# Patient Record
Sex: MALE | Race: WHITE | NOT HISPANIC OR LATINO | Employment: UNEMPLOYED | URBAN - METROPOLITAN AREA
[De-identification: names, ages, dates, MRNs, and addresses within clinical notes are randomized per-mention and may not be internally consistent; named-entity substitution may affect disease eponyms.]

---

## 2020-06-09 ENCOUNTER — TRANSCRIBE ORDERS (OUTPATIENT)
Dept: NEUROSURGERY | Facility: CLINIC | Age: 60
End: 2020-06-09

## 2020-06-09 DIAGNOSIS — M54.50 LOWER BACK PAIN: Primary | ICD-10-CM

## 2020-06-11 ENCOUNTER — CONSULT (OUTPATIENT)
Dept: NEUROSURGERY | Facility: CLINIC | Age: 60
End: 2020-06-11
Payer: COMMERCIAL

## 2020-06-11 VITALS
HEART RATE: 76 BPM | TEMPERATURE: 97.2 F | DIASTOLIC BLOOD PRESSURE: 104 MMHG | SYSTOLIC BLOOD PRESSURE: 139 MMHG | WEIGHT: 265 LBS | HEIGHT: 75 IN | RESPIRATION RATE: 16 BRPM | BODY MASS INDEX: 32.95 KG/M2

## 2020-06-11 DIAGNOSIS — Z79.4 TYPE 2 DIABETES MELLITUS WITHOUT COMPLICATION, WITH LONG-TERM CURRENT USE OF INSULIN (HCC): ICD-10-CM

## 2020-06-11 DIAGNOSIS — G57.72 COMPLEX REGIONAL PAIN SYNDROME TYPE 2 OF LEFT LOWER EXTREMITY: ICD-10-CM

## 2020-06-11 DIAGNOSIS — I63.9 CEREBROVASCULAR ACCIDENT (CVA), UNSPECIFIED MECHANISM (HCC): ICD-10-CM

## 2020-06-11 DIAGNOSIS — M54.50 LOWER BACK PAIN: ICD-10-CM

## 2020-06-11 DIAGNOSIS — E11.9 TYPE 2 DIABETES MELLITUS WITHOUT COMPLICATION, WITH LONG-TERM CURRENT USE OF INSULIN (HCC): ICD-10-CM

## 2020-06-11 DIAGNOSIS — I49.5 SICK SINUS SYNDROME (HCC): ICD-10-CM

## 2020-06-11 DIAGNOSIS — M96.1 FAILED BACK SYNDROME: Primary | ICD-10-CM

## 2020-06-11 DIAGNOSIS — T84.621A: ICD-10-CM

## 2020-06-11 DIAGNOSIS — G89.4 CHRONIC PAIN SYNDROME: ICD-10-CM

## 2020-06-11 PROCEDURE — 99205 OFFICE O/P NEW HI 60 MIN: CPT | Performed by: NEUROLOGICAL SURGERY

## 2020-06-11 RX ORDER — MORPHINE SULFATE 15 MG/1
15 TABLET, FILM COATED, EXTENDED RELEASE ORAL
COMMUNITY
End: 2021-03-07

## 2020-06-11 RX ORDER — GABAPENTIN 300 MG/1
400 CAPSULE ORAL 3 TIMES DAILY
COMMUNITY
Start: 2020-03-24

## 2020-06-11 RX ORDER — AMOXICILLIN 500 MG/1
500 TABLET, FILM COATED ORAL 3 TIMES DAILY
COMMUNITY
Start: 2020-04-02 | End: 2020-06-22 | Stop reason: SDUPTHER

## 2020-06-11 RX ORDER — ATORVASTATIN CALCIUM 40 MG/1
40 TABLET, FILM COATED ORAL
COMMUNITY
Start: 2020-05-04

## 2020-06-11 RX ORDER — NICOTINE POLACRILEX 2 MG
LOZENGE BUCCAL
COMMUNITY

## 2020-06-11 RX ORDER — APIXABAN 5 MG/1
5 TABLET, FILM COATED ORAL 2 TIMES DAILY
COMMUNITY
Start: 2020-05-05

## 2020-06-11 RX ORDER — INSULIN ASPART 100 [IU]/ML
INJECTION, SUSPENSION SUBCUTANEOUS
COMMUNITY
Start: 2020-06-10

## 2020-06-11 RX ORDER — ASPIRIN 81 MG/1
81 TABLET, CHEWABLE ORAL DAILY
COMMUNITY
Start: 2020-04-14

## 2020-06-11 RX ORDER — TAMSULOSIN HYDROCHLORIDE 0.4 MG/1
0.4 CAPSULE ORAL
COMMUNITY
Start: 2019-10-08

## 2020-06-11 RX ORDER — ACETAMINOPHEN 500 MG
500 TABLET ORAL EVERY 6 HOURS PRN
COMMUNITY

## 2020-06-19 ENCOUNTER — TELEPHONE (OUTPATIENT)
Dept: INFECTIOUS DISEASES | Facility: CLINIC | Age: 60
End: 2020-06-19

## 2020-06-22 ENCOUNTER — OFFICE VISIT (OUTPATIENT)
Dept: INFECTIOUS DISEASES | Facility: CLINIC | Age: 60
End: 2020-06-22
Payer: COMMERCIAL

## 2020-06-22 VITALS
HEIGHT: 75 IN | WEIGHT: 265 LBS | DIASTOLIC BLOOD PRESSURE: 64 MMHG | HEART RATE: 60 BPM | TEMPERATURE: 97.8 F | SYSTOLIC BLOOD PRESSURE: 132 MMHG | BODY MASS INDEX: 32.95 KG/M2

## 2020-06-22 DIAGNOSIS — G89.29 CHRONIC PAIN OF LEFT LOWER EXTREMITY: ICD-10-CM

## 2020-06-22 DIAGNOSIS — T84.621D: Primary | ICD-10-CM

## 2020-06-22 DIAGNOSIS — Z85.72 HISTORY OF NON-HODGKIN'S LYMPHOMA: ICD-10-CM

## 2020-06-22 DIAGNOSIS — E11.69 TYPE 2 DIABETES MELLITUS WITH OTHER SPECIFIED COMPLICATION, UNSPECIFIED WHETHER LONG TERM INSULIN USE (HCC): ICD-10-CM

## 2020-06-22 DIAGNOSIS — G89.29 CHRONIC LOW BACK PAIN WITHOUT SCIATICA, UNSPECIFIED BACK PAIN LATERALITY: ICD-10-CM

## 2020-06-22 DIAGNOSIS — Z86.19 HISTORY OF STAPH INFECTION: ICD-10-CM

## 2020-06-22 DIAGNOSIS — M79.605 CHRONIC PAIN OF LEFT LOWER EXTREMITY: ICD-10-CM

## 2020-06-22 DIAGNOSIS — M54.50 CHRONIC LOW BACK PAIN WITHOUT SCIATICA, UNSPECIFIED BACK PAIN LATERALITY: ICD-10-CM

## 2020-06-22 PROCEDURE — 99244 OFF/OP CNSLTJ NEW/EST MOD 40: CPT | Performed by: INTERNAL MEDICINE

## 2020-06-22 RX ORDER — AMOXICILLIN 500 MG/1
500 TABLET, FILM COATED ORAL EVERY 8 HOURS
Qty: 270 TABLET | Refills: 0 | Status: SHIPPED | OUTPATIENT
Start: 2020-06-22 | End: 2020-09-20

## 2020-06-29 ENCOUNTER — TELEPHONE (OUTPATIENT)
Dept: NEUROSURGERY | Facility: CLINIC | Age: 60
End: 2020-06-29

## 2020-07-02 ENCOUNTER — TRANSCRIBE ORDERS (OUTPATIENT)
Dept: ADMINISTRATIVE | Facility: HOSPITAL | Age: 60
End: 2020-07-02

## 2020-07-02 DIAGNOSIS — M86.552: Primary | ICD-10-CM

## 2020-07-14 ENCOUNTER — HOSPITAL ENCOUNTER (OUTPATIENT)
Dept: NUCLEAR MEDICINE | Facility: HOSPITAL | Age: 60
Discharge: HOME | End: 2020-07-14
Payer: COMMERCIAL

## 2020-07-14 DIAGNOSIS — M86.552: ICD-10-CM

## 2020-07-14 PROCEDURE — 78803 RP LOCLZJ TUM SPECT 1 AREA: CPT

## 2020-07-14 PROCEDURE — A9570 INDIUM IN-111 AUTO WBC: HCPCS

## 2020-07-15 ENCOUNTER — HOSPITAL ENCOUNTER (OUTPATIENT)
Dept: NUCLEAR MEDICINE | Facility: HOSPITAL | Age: 60
Discharge: HOME | End: 2020-07-15
Payer: COMMERCIAL

## 2020-07-15 PROCEDURE — A9541 TC99M SULFUR COLLOID: HCPCS

## 2020-07-15 PROCEDURE — 78102 BONE MARROW IMAGING LTD: CPT

## 2020-07-17 ENCOUNTER — TELEPHONE (OUTPATIENT)
Dept: NEUROSURGERY | Facility: CLINIC | Age: 60
End: 2020-07-17

## 2020-07-17 NOTE — TELEPHONE ENCOUNTER
7/17/20 PT CALLED BACK AND STATES HIS NUCLEAR BONE SCAN IS NEGATIVE SO HE IS GOING TO MOVE FORWARD WITH STIM TRIAL WITH HIS PM DRDR Pathak Backer  HE HAS APPT WITH HER NEXT WED 7/22/20  HE WILL HAVE HER OFFICE FAX RECORDS OF TRIAL IF SUCCESSFUL SO HE CAN RETURN WITH DR POTTER TO DISCUSS PERM STIM

## 2020-07-24 ENCOUNTER — TRANSCRIBE ORDERS (OUTPATIENT)
Dept: ADMINISTRATIVE | Facility: HOSPITAL | Age: 60
End: 2020-07-24

## 2020-07-24 DIAGNOSIS — Z01.818 PRE-OPERATIVE CLEARANCE: Primary | ICD-10-CM

## 2020-09-02 ENCOUNTER — HOSPITAL ENCOUNTER (OUTPATIENT)
Dept: MRI IMAGING | Facility: HOSPITAL | Age: 60
Discharge: HOME | End: 2020-09-02
Payer: COMMERCIAL

## 2020-09-02 ENCOUNTER — HOSPITAL ENCOUNTER (OUTPATIENT)
Dept: RADIOLOGY | Facility: HOSPITAL | Age: 60
Discharge: HOME | End: 2020-09-02
Payer: COMMERCIAL

## 2020-09-02 DIAGNOSIS — Z01.818 PRE-OPERATIVE CLEARANCE: ICD-10-CM

## 2020-09-02 PROCEDURE — 72146 MRI CHEST SPINE W/O DYE: CPT

## 2020-09-02 NOTE — PROGRESS NOTES
Patient's pacer interrogated by Desk and placed in MRI safe mode  Patient's HR and O2 status monitored for entire MRI  No complications  Patient's pacer placed back in original mode after MRI complete

## 2020-09-17 ENCOUNTER — TELEPHONE (OUTPATIENT)
Dept: INFECTIOUS DISEASES | Facility: CLINIC | Age: 60
End: 2020-09-17

## 2020-09-21 ENCOUNTER — APPOINTMENT (OUTPATIENT)
Dept: LAB | Facility: HOSPITAL | Age: 60
End: 2020-09-21
Attending: INTERNAL MEDICINE
Payer: COMMERCIAL

## 2020-09-21 LAB
BASOPHILS # BLD AUTO: 0.05 THOUSANDS/ΜL (ref 0–0.1)
BASOPHILS NFR BLD AUTO: 1 % (ref 0–1)
CREAT SERPL-MCNC: 1.03 MG/DL (ref 0.6–1.3)
EOSINOPHIL # BLD AUTO: 0.13 THOUSAND/ΜL (ref 0–0.61)
EOSINOPHIL NFR BLD AUTO: 2 % (ref 0–6)
ERYTHROCYTE [DISTWIDTH] IN BLOOD BY AUTOMATED COUNT: 12.9 % (ref 11.6–15.1)
GFR SERPL CREATININE-BSD FRML MDRD: 79 ML/MIN/1.73SQ M
HCT VFR BLD AUTO: 46.5 % (ref 36.5–49.3)
HGB BLD-MCNC: 15 G/DL (ref 12–17)
IMM GRANULOCYTES # BLD AUTO: 0.03 THOUSAND/UL (ref 0–0.2)
IMM GRANULOCYTES NFR BLD AUTO: 0 % (ref 0–2)
LYMPHOCYTES # BLD AUTO: 1.61 THOUSANDS/ΜL (ref 0.6–4.47)
LYMPHOCYTES NFR BLD AUTO: 21 % (ref 14–44)
MCH RBC QN AUTO: 29 PG (ref 26.8–34.3)
MCHC RBC AUTO-ENTMCNC: 32.3 G/DL (ref 31.4–37.4)
MCV RBC AUTO: 90 FL (ref 82–98)
MONOCYTES # BLD AUTO: 0.66 THOUSAND/ΜL (ref 0.17–1.22)
MONOCYTES NFR BLD AUTO: 8 % (ref 4–12)
NEUTROPHILS # BLD AUTO: 5.38 THOUSANDS/ΜL (ref 1.85–7.62)
NEUTS SEG NFR BLD AUTO: 68 % (ref 43–75)
NRBC BLD AUTO-RTO: 0 /100 WBCS
PLATELET # BLD AUTO: 220 THOUSANDS/UL (ref 149–390)
PMV BLD AUTO: 11.3 FL (ref 8.9–12.7)
RBC # BLD AUTO: 5.18 MILLION/UL (ref 3.88–5.62)
WBC # BLD AUTO: 7.86 THOUSAND/UL (ref 4.31–10.16)

## 2020-09-21 PROCEDURE — 85025 COMPLETE CBC W/AUTO DIFF WBC: CPT

## 2020-09-21 PROCEDURE — 82565 ASSAY OF CREATININE: CPT

## 2020-09-21 PROCEDURE — 36415 COLL VENOUS BLD VENIPUNCTURE: CPT

## 2020-09-23 ENCOUNTER — OFFICE VISIT (OUTPATIENT)
Dept: INFECTIOUS DISEASES | Facility: CLINIC | Age: 60
End: 2020-09-23
Payer: COMMERCIAL

## 2020-09-23 VITALS
HEIGHT: 75 IN | RESPIRATION RATE: 16 BRPM | WEIGHT: 290.4 LBS | HEART RATE: 84 BPM | BODY MASS INDEX: 36.11 KG/M2 | TEMPERATURE: 98.4 F | DIASTOLIC BLOOD PRESSURE: 72 MMHG | SYSTOLIC BLOOD PRESSURE: 128 MMHG

## 2020-09-23 DIAGNOSIS — T84.621D: Primary | ICD-10-CM

## 2020-09-23 DIAGNOSIS — G89.29 CHRONIC LEFT-SIDED LOW BACK PAIN WITH LEFT-SIDED SCIATICA: ICD-10-CM

## 2020-09-23 DIAGNOSIS — M54.42 CHRONIC LEFT-SIDED LOW BACK PAIN WITH LEFT-SIDED SCIATICA: ICD-10-CM

## 2020-09-23 PROBLEM — M54.40 CHRONIC LEFT-SIDED LOW BACK PAIN WITH SCIATICA: Status: ACTIVE | Noted: 2020-09-23

## 2020-09-23 PROCEDURE — 99214 OFFICE O/P EST MOD 30 MIN: CPT | Performed by: INTERNAL MEDICINE

## 2020-09-23 RX ORDER — AMOXICILLIN 500 MG/1
500 CAPSULE ORAL EVERY 8 HOURS
Qty: 270 CAPSULE | Refills: 1 | Status: SHIPPED | OUTPATIENT
Start: 2020-09-23 | End: 2021-03-07

## 2020-09-23 RX ORDER — SOTALOL HYDROCHLORIDE 80 MG/1
80 TABLET ORAL EVERY 12 HOURS
COMMUNITY
Start: 2020-09-19

## 2020-09-23 RX ORDER — AMOXICILLIN 500 MG/1
500 CAPSULE ORAL EVERY 8 HOURS
COMMUNITY
Start: 2020-07-20 | End: 2020-09-23 | Stop reason: SDUPTHER

## 2020-09-23 NOTE — PROGRESS NOTES
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  Patient remains on suppressive antibiotic therapy after relapse of infection in his left femur and concern for chronic hardware infection  WBC scan from 7/15/20 negative for infection/osteomyelitis  Seems to be tolerating oral amoxicillin without difficulty  Discussed risks vs benefits of continuing longterm antibiotic  As patient will be undergoing spinal cord stimulator placement, plan for continue for foreseeable future  Continue amoxicillin 500 Q 8 suppression for now, likely indefinitely  Continue monthly lab with CBCD and Cr  Followup in 3-4 months      2  Chronic back and leg pain  Patient has developed progressing pain and immobility in the leg as of March with some degree of back pain  He was seen by neurosurgery who ultimately recommended spinal cord stimulator  Will continue suppressive antibiotic for now  Plan for spinal cord stimulator placement next week  Neurosurgery/pain management followup    3  Previous hardware infections with MSSA and Serratia  Remains on amoxicillin as recent infection involved Enterococcus as above      4  History of non-Hodgkin's lymphoma  Course was complicated by femur fracture and later developed infections as above  Continued follow-up with oncologist/PCP      5  Type 2 diabetes  Ongoing follow-up by PCP  Followup in 3-4 months  Subjective:  Patient is here for followup  Complicated medical history including chronic hardware infection of left femur previousl managed at [facility]  He has previously undergone left distal femur replacement and ultimately placed on oral amoxicillin indefinitely for suppression due to hardware retention  Patient recently transitioned care to us in June 2020  He has developed progressive back pain radiating down left leg  He was seen by neurosurgery with recommendation for spinal cord stimulator placement  Patient was referred for WBC scan, which was performed on 7/15/20 showing no uptake in left leg to suggest active infection or osteomyelitis  Patient is now scheduled to undergo spinal cord stimulator placement next week  He feels well  Occasional loose stools, but not daily  No nausea  No fevers or chills  No opens wounds  Personally reviewed and updated as appropriate: past medical hx, past surgical hx, social hx, current meds, allergies, problem list      Objective:  Vitals:  [unfilled]  [email or web address]:      Physical Exam:   General:  No acute distress  HEENT: AT/NC  Psychiatric:  Awake and alert  Pulmonary:  Normal respiratory excursion without accessory muscle use  Abdomen:  Soft, nontender  Extremities:  No edema  Skin:  No rashes  Neuro: No obvious focal deficits  Lab Results:  I have personally reviewed pertinent labs  Results from last 7 days   Lab Units 09/21/20  1301   CREATININE mg/dL 1 03   EGFR ml/min/1 73sq m 79     Results from last 7 days   Lab Units 09/21/20  1301   WBC Thousand/uL 7 86   HEMOGLOBIN g/dL 15 0   PLATELETS Thousands/uL 220           Imaging Studies:   I have personally reviewed pertinent imaging study reports and images in PACS  7/15 WBC scan showed no update at left femur  EKG, Pathology, and Other Studies:   I have personally reviewed pertinent reports

## 2020-09-23 NOTE — PATIENT INSTRUCTIONS
Continue amoxicillin as discussed  Please obtain monthly labwork  Follow up in office in Jan/Feb  We will call you to schedule

## 2020-10-01 ENCOUNTER — TELEPHONE (OUTPATIENT)
Dept: NEUROSURGERY | Facility: CLINIC | Age: 60
End: 2020-10-01

## 2021-01-04 ENCOUNTER — TELEPHONE (OUTPATIENT)
Dept: INFECTIOUS DISEASES | Facility: CLINIC | Age: 61
End: 2021-01-04

## 2021-01-04 NOTE — TELEPHONE ENCOUNTER
Patient and spouse call today to report that they had gone to ortho today which is in [address] [place] [address]  They are asked to hold their antibiotics for three weeks until 1/25 (next ortho visit) so that they can take a bone biopsy to determine if there is residual infection  Patient has f/u scheduled with Brenda HATCH 1/26 which I did confirm with them that they would be sure to make  This will be to discuss/determine plan based on f/u ortho visit 1/25  Routed this message to Dr Coleen Olson

## 2021-02-08 ENCOUNTER — TELEPHONE (OUTPATIENT)
Dept: INFECTIOUS DISEASES | Facility: CLINIC | Age: 61
End: 2021-02-08

## 2021-03-07 ENCOUNTER — HOSPITAL ENCOUNTER (EMERGENCY)
Facility: HOSPITAL | Age: 61
Discharge: ANOTHER HOSPITAL | End: 2021-03-07
Attending: EMERGENCY MEDICINE | Admitting: EMERGENCY MEDICINE
Payer: COMMERCIAL

## 2021-03-07 VITALS
WEIGHT: 309.75 LBS | DIASTOLIC BLOOD PRESSURE: 62 MMHG | SYSTOLIC BLOOD PRESSURE: 135 MMHG | BODY MASS INDEX: 38.72 KG/M2 | OXYGEN SATURATION: 98 % | TEMPERATURE: 98.2 F | HEART RATE: 69 BPM | RESPIRATION RATE: 20 BRPM

## 2021-03-07 DIAGNOSIS — R09.89 DECREASED PULSE: ICD-10-CM

## 2021-03-07 DIAGNOSIS — G89.18 POSTOPERATIVE PAIN: Primary | ICD-10-CM

## 2021-03-07 DIAGNOSIS — I99.8 LIMB ISCHEMIA: ICD-10-CM

## 2021-03-07 LAB
ANION GAP SERPL CALCULATED.3IONS-SCNC: 4 MMOL/L (ref 4–13)
APTT PPP: 34 SECONDS (ref 23–37)
BASOPHILS # BLD AUTO: 0.04 THOUSANDS/ΜL (ref 0–0.1)
BASOPHILS NFR BLD AUTO: 1 % (ref 0–1)
BUN SERPL-MCNC: 13 MG/DL (ref 5–25)
CALCIUM SERPL-MCNC: 9 MG/DL (ref 8.3–10.1)
CHLORIDE SERPL-SCNC: 101 MMOL/L (ref 100–108)
CO2 SERPL-SCNC: 31 MMOL/L (ref 21–32)
CREAT SERPL-MCNC: 1.27 MG/DL (ref 0.6–1.3)
EOSINOPHIL # BLD AUTO: 0.19 THOUSAND/ΜL (ref 0–0.61)
EOSINOPHIL NFR BLD AUTO: 2 % (ref 0–6)
ERYTHROCYTE [DISTWIDTH] IN BLOOD BY AUTOMATED COUNT: 14 % (ref 11.6–15.1)
GFR SERPL CREATININE-BSD FRML MDRD: 61 ML/MIN/1.73SQ M
GLUCOSE SERPL-MCNC: 171 MG/DL (ref 65–140)
HCT VFR BLD AUTO: 31.2 % (ref 36.5–49.3)
HGB BLD-MCNC: 9.8 G/DL (ref 12–17)
INR PPP: 1.36 (ref 0.84–1.19)
LYMPHOCYTES # BLD AUTO: 2.22 THOUSANDS/ΜL (ref 0.6–4.47)
LYMPHOCYTES NFR BLD AUTO: 26 % (ref 14–44)
MCH RBC QN AUTO: 28.7 PG (ref 26.8–34.3)
MCHC RBC AUTO-ENTMCNC: 31.4 G/DL (ref 31.4–37.4)
MCV RBC AUTO: 91 FL (ref 82–98)
MONOCYTES # BLD AUTO: 0.93 THOUSAND/ΜL (ref 0.17–1.22)
MONOCYTES NFR BLD AUTO: 11 % (ref 4–12)
NEUTROPHILS # BLD AUTO: 5.29 THOUSANDS/ΜL (ref 1.85–7.62)
NEUTS SEG NFR BLD AUTO: 60 % (ref 43–75)
PLATELET # BLD AUTO: 291 THOUSANDS/UL (ref 149–390)
PMV BLD AUTO: 10.4 FL (ref 8.9–12.7)
POTASSIUM SERPL-SCNC: 4.2 MMOL/L (ref 3.5–5.3)
PROTHROMBIN TIME: 17 SECONDS (ref 11.6–14.5)
RBC # BLD AUTO: 3.42 MILLION/UL (ref 3.88–5.62)
SODIUM SERPL-SCNC: 136 MMOL/L (ref 136–145)
WBC # BLD AUTO: 8.67 THOUSAND/UL (ref 4.31–10.16)

## 2021-03-07 PROCEDURE — 85610 PROTHROMBIN TIME: CPT | Performed by: EMERGENCY MEDICINE

## 2021-03-07 PROCEDURE — 99285 EMERGENCY DEPT VISIT HI MDM: CPT

## 2021-03-07 PROCEDURE — 96374 THER/PROPH/DIAG INJ IV PUSH: CPT

## 2021-03-07 PROCEDURE — 85730 THROMBOPLASTIN TIME PARTIAL: CPT | Performed by: EMERGENCY MEDICINE

## 2021-03-07 PROCEDURE — 85025 COMPLETE CBC W/AUTO DIFF WBC: CPT | Performed by: EMERGENCY MEDICINE

## 2021-03-07 PROCEDURE — 80048 BASIC METABOLIC PNL TOTAL CA: CPT | Performed by: EMERGENCY MEDICINE

## 2021-03-07 PROCEDURE — 36415 COLL VENOUS BLD VENIPUNCTURE: CPT | Performed by: EMERGENCY MEDICINE

## 2021-03-07 PROCEDURE — 99285 EMERGENCY DEPT VISIT HI MDM: CPT | Performed by: EMERGENCY MEDICINE

## 2021-03-07 RX ORDER — FENTANYL CITRATE 50 UG/ML
50 INJECTION, SOLUTION INTRAMUSCULAR; INTRAVENOUS ONCE
Status: COMPLETED | OUTPATIENT
Start: 2021-03-07 | End: 2021-03-07

## 2021-03-07 RX ORDER — OXYCODONE HYDROCHLORIDE 5 MG/1
5 TABLET ORAL
COMMUNITY
Start: 2021-02-21

## 2021-03-07 RX ADMIN — FENTANYL CITRATE 50 MCG: 50 INJECTION, SOLUTION INTRAMUSCULAR; INTRAVENOUS at 22:26

## 2021-03-08 NOTE — ED NOTES
information: [facility]   20 min   Lamarr Lundborg Dr Shella Caprice  [phone]     Azul Cloud, RN  03/07/21 [id number]

## 2021-03-08 NOTE — EMTALA/ACUTE CARE TRANSFER
[facility] [address]  [address]  [place] [address]  Dept: [phone]      EMTALA TRANSFER CONSENT    NAME Debbie Garcia                                         1960                              MRN [id number]    I have been informed of my rights regarding examination, treatment, and transfer   by Dr Indra Neville MD    Benefits: Specialized equipment and/or services available at the receiving facility (Include comment)________________________, Continuity of care(Vascular (unavailable at this time), Orthopaedics (continuity))    Risks: Potential for delay in receiving treatment, Potential deterioration of medical condition, Loss of IV, Possible worsening of condition or death during transfer, Increased discomfort during transfer      Transfer Request   I acknowledge that my medical condition has been evaluated and explained to me by the emergency department physician or other qualified medical person and/or my attending physician who has recommended and offered to me further medical examination and treatment  I understand the Hospital's obligation with respect to the treatment and stabilization of my emergency medical condition  I nevertheless request to be transferred  I release the Hospital, the doctor, and any other persons caring for me from all responsibility or liability for any injury or ill effects that may result from my transfer and agree to accept all responsibility for the consequences of my choice to transfer, rather than receive stabilizing treatment at the Hospital  I understand that because the transfer is my request, my insurance may not provide reimbursement for the services  The Hospital will assist and direct me and my family in how to make arrangements for transfer, but the hospital is not liable for any fees charged by the transport service    In spite of this understanding, I refuse to consent to further medical examination and treatment which has been offered to me, and request transfer to [address] : [facility]  I authorize the performance of emergency medical procedures and treatments upon me in both transit and upon arrival at the receiving facility  Additionally, I authorize the release of any and all medical records to the receiving facility and request they be transported with me, if possible  I authorize the performance of emergency medical procedures and treatments upon me in both transit and upon arrival at the receiving facility  Additionally, I authorize the release of any and all medical records to the receiving facility and request they be transported with me, if possible  I understand that the safest mode of transportation during a medical emergency is an ambulance and that the Hospital advocates the use of this mode of transport  Risks of traveling to the receiving facility by car, including absence of medical control, life sustaining equipment, such as oxygen, and medical personnel has been explained to me and I fully understand them  (KASEY CORRECT BOX BELOW)  [  ]  I consent to the stated transfer and to be transported by ambulance/helicopter  [  ]  I consent to the stated transfer, but refuse transportation by ambulance and accept full responsibility for my transportation by car  I understand the risks of non-ambulance transfers and I exonerate the Hospital and its staff from any deterioration in my condition that results from this refusal     X___________________________________________    DATE  21  TIME________  Signature of patient or legally responsible individual signing on patient behalf           RELATIONSHIP TO PATIENT_________________________          Provider Certification    NAME Kayla Keys                                         1960                              MRN [id number]    A medical screening exam was performed on the above named patient    Based on the examination:    Condition Necessitating Transfer The primary encounter diagnosis was Postoperative pain  A diagnosis of Decreased pulse was also pertinent to this visit  Patient Condition: The patient has been stabilized such that within reasonable medical probability, no material deterioration of the patient condition or the condition of the unborn child(nina) is likely to result from the transfer    Reason for Transfer: Level of Care needed not available at this facility    Transfer Requirements: [facility] [address]   · Space available and qualified personnel available for treatment as acknowledged by [facility]   · Agreed to accept transfer and to provide appropriate medical treatment as acknowledged by       Carrie Frederick  · Appropriate medical records of the examination and treatment of the patient are provided at the time of transfer   [address] _______  · Transfer will be performed by qualified personnel from [facility]  and appropriate transfer equipment as required, including the use of necessary and appropriate life support measures      Provider Certification: I have examined the patient and explained the following risks and benefits of being transferred/refusing transfer to the patient/family:  General risk, such as traffic hazards, adverse weather conditions, rough terrain or turbulence, possible failure of equipment (including vehicle or aircraft), or consequences of actions of persons outside the control of the transport personnel, The possibility of a transport vehicle being unavailable, Unanticipated needs of medical equipment and personnel during transport, Risk of worsening condition      Based on these reasonable risks and benefits to the patient and/or the unborn child(nina), and based upon the information available at the time of the patients examination, I certify that the medical benefits reasonably to be expected from the provision of appropriate medical treatments at another medical facility outweigh the increasing risks, if any, to the individuals medical condition, and in the case of labor to the unborn child, from effecting the transfer      X____________________________________________ DATE 03/07/21        TIME_______      ORIGINAL - SEND TO MEDICAL RECORDS   COPY - SEND WITH PATIENT DURING TRANSFER
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
## 2021-03-08 NOTE — ED NOTES
Report called to accepting RN, Konrad Burden at [facility] ER       Yajaira Guillen RN  03/07/21 9780

## 2021-04-27 ENCOUNTER — APPOINTMENT (EMERGENCY)
Dept: CT IMAGING | Facility: HOSPITAL | Age: 61
DRG: 463 | End: 2021-04-27
Payer: COMMERCIAL

## 2021-04-27 ENCOUNTER — APPOINTMENT (EMERGENCY)
Dept: RADIOLOGY | Facility: HOSPITAL | Age: 61
DRG: 463 | End: 2021-04-27
Payer: COMMERCIAL

## 2021-04-27 ENCOUNTER — HOSPITAL ENCOUNTER (INPATIENT)
Facility: HOSPITAL | Age: 61
LOS: 3 days | Discharge: HOME | DRG: 463 | End: 2021-04-30
Attending: EMERGENCY MEDICINE | Admitting: INTERNAL MEDICINE
Payer: COMMERCIAL

## 2021-04-27 DIAGNOSIS — N30.00 ACUTE CYSTITIS WITHOUT HEMATURIA: ICD-10-CM

## 2021-04-27 DIAGNOSIS — N30.90 CYSTITIS: ICD-10-CM

## 2021-04-27 DIAGNOSIS — N39.0 URINARY TRACT INFECTION: ICD-10-CM

## 2021-04-27 DIAGNOSIS — A41.9 SEPSIS (HCC): Primary | ICD-10-CM

## 2021-04-27 DIAGNOSIS — G47.00 INSOMNIA: ICD-10-CM

## 2021-04-27 DIAGNOSIS — M54.40 CHRONIC LEFT-SIDED LOW BACK PAIN WITH SCIATICA: ICD-10-CM

## 2021-04-27 DIAGNOSIS — G89.29 CHRONIC LEFT-SIDED LOW BACK PAIN WITH SCIATICA: ICD-10-CM

## 2021-04-27 DIAGNOSIS — R33.9 URINARY RETENTION: ICD-10-CM

## 2021-04-27 LAB
ALBUMIN SERPL BCP-MCNC: 3.1 G/DL (ref 3.5–5)
ALP SERPL-CCNC: 80 U/L (ref 46–116)
ALT SERPL W P-5'-P-CCNC: 36 U/L (ref 12–78)
ANION GAP SERPL CALCULATED.3IONS-SCNC: 8 MMOL/L (ref 4–13)
AST SERPL W P-5'-P-CCNC: 31 U/L (ref 5–45)
BACTERIA UR QL AUTO: ABNORMAL /HPF
BASOPHILS # BLD AUTO: 0.05 THOUSANDS/ΜL (ref 0–0.1)
BASOPHILS NFR BLD AUTO: 0 % (ref 0–1)
BILIRUB DIRECT SERPL-MCNC: 0.2 MG/DL (ref 0–0.2)
BILIRUB SERPL-MCNC: 0.96 MG/DL (ref 0.2–1)
BILIRUB UR QL STRIP: NEGATIVE
BUN SERPL-MCNC: 19 MG/DL (ref 5–25)
CALCIUM SERPL-MCNC: 8.9 MG/DL (ref 8.3–10.1)
CHLORIDE SERPL-SCNC: 96 MMOL/L (ref 100–108)
CLARITY UR: CLEAR
CO2 SERPL-SCNC: 27 MMOL/L (ref 21–32)
COLOR UR: ABNORMAL
CREAT SERPL-MCNC: 1.27 MG/DL (ref 0.6–1.3)
EOSINOPHIL # BLD AUTO: 0.03 THOUSAND/ΜL (ref 0–0.61)
EOSINOPHIL NFR BLD AUTO: 0 % (ref 0–6)
ERYTHROCYTE [DISTWIDTH] IN BLOOD BY AUTOMATED COUNT: 14.6 % (ref 11.6–15.1)
GFR SERPL CREATININE-BSD FRML MDRD: 61 ML/MIN/1.73SQ M
GLUCOSE SERPL-MCNC: 247 MG/DL (ref 65–140)
GLUCOSE UR STRIP-MCNC: ABNORMAL MG/DL
HCT VFR BLD AUTO: 43 % (ref 36.5–49.3)
HGB BLD-MCNC: 13.8 G/DL (ref 12–17)
HGB UR QL STRIP.AUTO: ABNORMAL
IMM GRANULOCYTES # BLD AUTO: 0.12 THOUSAND/UL (ref 0–0.2)
IMM GRANULOCYTES NFR BLD AUTO: 1 % (ref 0–2)
KETONES UR STRIP-MCNC: ABNORMAL MG/DL
LACTATE SERPL-SCNC: 1.4 MMOL/L (ref 0.5–2)
LEUKOCYTE ESTERASE UR QL STRIP: ABNORMAL
LIPASE SERPL-CCNC: 29 U/L (ref 73–393)
LYMPHOCYTES # BLD AUTO: 1.22 THOUSANDS/ΜL (ref 0.6–4.47)
LYMPHOCYTES NFR BLD AUTO: 8 % (ref 14–44)
MCH RBC QN AUTO: 27.2 PG (ref 26.8–34.3)
MCHC RBC AUTO-ENTMCNC: 32.1 G/DL (ref 31.4–37.4)
MCV RBC AUTO: 85 FL (ref 82–98)
MONOCYTES # BLD AUTO: 1.05 THOUSAND/ΜL (ref 0.17–1.22)
MONOCYTES NFR BLD AUTO: 7 % (ref 4–12)
NEUTROPHILS # BLD AUTO: 12.65 THOUSANDS/ΜL (ref 1.85–7.62)
NEUTS SEG NFR BLD AUTO: 84 % (ref 43–75)
NITRITE UR QL STRIP: NEGATIVE
NON-SQ EPI CELLS URNS QL MICRO: ABNORMAL /HPF
NRBC BLD AUTO-RTO: 0 /100 WBCS
PH UR STRIP.AUTO: 5 [PH]
PLATELET # BLD AUTO: 210 THOUSANDS/UL (ref 149–390)
PMV BLD AUTO: 11.2 FL (ref 8.9–12.7)
POTASSIUM SERPL-SCNC: 4.3 MMOL/L (ref 3.5–5.3)
PROT SERPL-MCNC: 8.9 G/DL (ref 6.4–8.2)
PROT UR STRIP-MCNC: NEGATIVE MG/DL
RBC # BLD AUTO: 5.07 MILLION/UL (ref 3.88–5.62)
RBC #/AREA URNS AUTO: ABNORMAL /HPF
SARS-COV-2 RNA RESP QL NAA+PROBE: NEGATIVE
SODIUM SERPL-SCNC: 131 MMOL/L (ref 136–145)
SP GR UR STRIP.AUTO: 1.02 (ref 1–1.03)
TROPONIN I SERPL-MCNC: <0.02 NG/ML
UROBILINOGEN UR QL STRIP.AUTO: 1 E.U./DL
WBC # BLD AUTO: 15.12 THOUSAND/UL (ref 4.31–10.16)
WBC #/AREA URNS AUTO: ABNORMAL /HPF

## 2021-04-27 PROCEDURE — 96366 THER/PROPH/DIAG IV INF ADDON: CPT

## 2021-04-27 PROCEDURE — 84484 ASSAY OF TROPONIN QUANT: CPT | Performed by: EMERGENCY MEDICINE

## 2021-04-27 PROCEDURE — 99285 EMERGENCY DEPT VISIT HI MDM: CPT | Performed by: PHYSICIAN ASSISTANT

## 2021-04-27 PROCEDURE — 83690 ASSAY OF LIPASE: CPT | Performed by: EMERGENCY MEDICINE

## 2021-04-27 PROCEDURE — 83605 ASSAY OF LACTIC ACID: CPT | Performed by: EMERGENCY MEDICINE

## 2021-04-27 PROCEDURE — U0003 INFECTIOUS AGENT DETECTION BY NUCLEIC ACID (DNA OR RNA); SEVERE ACUTE RESPIRATORY SYNDROME CORONAVIRUS 2 (SARS-COV-2) (CORONAVIRUS DISEASE [COVID-19]), AMPLIFIED PROBE TECHNIQUE, MAKING USE OF HIGH THROUGHPUT TECHNOLOGIES AS DESCRIBED BY CMS-2020-01-R: HCPCS | Performed by: EMERGENCY MEDICINE

## 2021-04-27 PROCEDURE — 96365 THER/PROPH/DIAG IV INF INIT: CPT

## 2021-04-27 PROCEDURE — U0005 INFEC AGEN DETEC AMPLI PROBE: HCPCS | Performed by: EMERGENCY MEDICINE

## 2021-04-27 PROCEDURE — 96368 THER/DIAG CONCURRENT INF: CPT

## 2021-04-27 PROCEDURE — 74177 CT ABD & PELVIS W/CONTRAST: CPT

## 2021-04-27 PROCEDURE — 81001 URINALYSIS AUTO W/SCOPE: CPT | Performed by: EMERGENCY MEDICINE

## 2021-04-27 PROCEDURE — 73502 X-RAY EXAM HIP UNI 2-3 VIEWS: CPT

## 2021-04-27 PROCEDURE — 80048 BASIC METABOLIC PNL TOTAL CA: CPT | Performed by: EMERGENCY MEDICINE

## 2021-04-27 PROCEDURE — 36415 COLL VENOUS BLD VENIPUNCTURE: CPT | Performed by: EMERGENCY MEDICINE

## 2021-04-27 PROCEDURE — 0T9B70Z DRAINAGE OF BLADDER WITH DRAINAGE DEVICE, VIA NATURAL OR ARTIFICIAL OPENING: ICD-10-PCS | Performed by: INTERNAL MEDICINE

## 2021-04-27 PROCEDURE — 96375 TX/PRO/DX INJ NEW DRUG ADDON: CPT

## 2021-04-27 PROCEDURE — 87086 URINE CULTURE/COLONY COUNT: CPT | Performed by: EMERGENCY MEDICINE

## 2021-04-27 PROCEDURE — 93005 ELECTROCARDIOGRAM TRACING: CPT

## 2021-04-27 PROCEDURE — 87040 BLOOD CULTURE FOR BACTERIA: CPT | Performed by: EMERGENCY MEDICINE

## 2021-04-27 PROCEDURE — 87186 SC STD MICRODIL/AGAR DIL: CPT | Performed by: EMERGENCY MEDICINE

## 2021-04-27 PROCEDURE — 99285 EMERGENCY DEPT VISIT HI MDM: CPT

## 2021-04-27 PROCEDURE — 85025 COMPLETE CBC W/AUTO DIFF WBC: CPT | Performed by: EMERGENCY MEDICINE

## 2021-04-27 PROCEDURE — 87077 CULTURE AEROBIC IDENTIFY: CPT | Performed by: EMERGENCY MEDICINE

## 2021-04-27 PROCEDURE — 80076 HEPATIC FUNCTION PANEL: CPT | Performed by: EMERGENCY MEDICINE

## 2021-04-27 PROCEDURE — 73552 X-RAY EXAM OF FEMUR 2/>: CPT

## 2021-04-27 RX ORDER — KETOROLAC TROMETHAMINE 30 MG/ML
15 INJECTION, SOLUTION INTRAMUSCULAR; INTRAVENOUS ONCE
Status: COMPLETED | OUTPATIENT
Start: 2021-04-27 | End: 2021-04-27

## 2021-04-27 RX ORDER — ACETAMINOPHEN 325 MG/1
650 TABLET ORAL ONCE
Status: COMPLETED | OUTPATIENT
Start: 2021-04-27 | End: 2021-04-27

## 2021-04-27 RX ORDER — HYDROMORPHONE HCL/PF 1 MG/ML
0.5 SYRINGE (ML) INJECTION ONCE
Status: COMPLETED | OUTPATIENT
Start: 2021-04-27 | End: 2021-04-27

## 2021-04-27 RX ORDER — TRAMADOL HYDROCHLORIDE 50 MG/1
50 TABLET ORAL ONCE
Status: COMPLETED | OUTPATIENT
Start: 2021-04-28 | End: 2021-04-28

## 2021-04-27 RX ADMIN — HYDROMORPHONE HYDROCHLORIDE 0.5 MG: 1 INJECTION, SOLUTION INTRAMUSCULAR; INTRAVENOUS; SUBCUTANEOUS at 21:41

## 2021-04-27 RX ADMIN — IOHEXOL 100 ML: 350 INJECTION, SOLUTION INTRAVENOUS at 21:57

## 2021-04-27 RX ADMIN — ACETAMINOPHEN 650 MG: 325 TABLET, FILM COATED ORAL at 21:48

## 2021-04-27 RX ADMIN — CEFEPIME HYDROCHLORIDE 2000 MG: 2 INJECTION, POWDER, FOR SOLUTION INTRAVENOUS at 22:52

## 2021-04-27 RX ADMIN — KETOROLAC TROMETHAMINE 15 MG: 30 INJECTION, SOLUTION INTRAMUSCULAR at 21:38

## 2021-04-27 RX ADMIN — SODIUM CHLORIDE, SODIUM LACTATE, POTASSIUM CHLORIDE, AND CALCIUM CHLORIDE 1000 ML: .6; .31; .03; .02 INJECTION, SOLUTION INTRAVENOUS at 21:42

## 2021-04-28 PROBLEM — N30.00 ACUTE CYSTITIS WITHOUT HEMATURIA: Status: ACTIVE | Noted: 2021-04-28

## 2021-04-28 PROBLEM — E11.65 TYPE 2 DIABETES MELLITUS WITH HYPERGLYCEMIA, WITH LONG-TERM CURRENT USE OF INSULIN (HCC): Status: ACTIVE | Noted: 2021-04-28

## 2021-04-28 PROBLEM — R33.9 URINARY RETENTION: Status: ACTIVE | Noted: 2021-04-28

## 2021-04-28 PROBLEM — I48.20 CHRONIC ATRIAL FIBRILLATION (HCC): Status: ACTIVE | Noted: 2021-04-28

## 2021-04-28 PROBLEM — Z79.4 TYPE 2 DIABETES MELLITUS WITH HYPERGLYCEMIA, WITH LONG-TERM CURRENT USE OF INSULIN (HCC): Status: ACTIVE | Noted: 2021-04-28

## 2021-04-28 LAB
ANION GAP SERPL CALCULATED.3IONS-SCNC: 7 MMOL/L (ref 4–13)
ATRIAL RATE: 85 BPM
BASOPHILS # BLD AUTO: 0.04 THOUSANDS/ΜL (ref 0–0.1)
BASOPHILS NFR BLD AUTO: 0 % (ref 0–1)
BUN SERPL-MCNC: 21 MG/DL (ref 5–25)
CALCIUM SERPL-MCNC: 7.9 MG/DL (ref 8.3–10.1)
CHLORIDE SERPL-SCNC: 99 MMOL/L (ref 100–108)
CO2 SERPL-SCNC: 28 MMOL/L (ref 21–32)
CREAT SERPL-MCNC: 1.23 MG/DL (ref 0.6–1.3)
EOSINOPHIL # BLD AUTO: 0.03 THOUSAND/ΜL (ref 0–0.61)
EOSINOPHIL NFR BLD AUTO: 0 % (ref 0–6)
ERYTHROCYTE [DISTWIDTH] IN BLOOD BY AUTOMATED COUNT: 14.4 % (ref 11.6–15.1)
EST. AVERAGE GLUCOSE BLD GHB EST-MCNC: 160 MG/DL
GFR SERPL CREATININE-BSD FRML MDRD: 63 ML/MIN/1.73SQ M
GLUCOSE SERPL-MCNC: 166 MG/DL (ref 65–140)
GLUCOSE SERPL-MCNC: 187 MG/DL (ref 65–140)
GLUCOSE SERPL-MCNC: 187 MG/DL (ref 65–140)
GLUCOSE SERPL-MCNC: 193 MG/DL (ref 65–140)
GLUCOSE SERPL-MCNC: 200 MG/DL (ref 65–140)
GLUCOSE SERPL-MCNC: 230 MG/DL (ref 65–140)
GLUCOSE SERPL-MCNC: 233 MG/DL (ref 65–140)
GLUCOSE SERPL-MCNC: 234 MG/DL (ref 65–140)
HBA1C MFR BLD: 7.2 %
HCT VFR BLD AUTO: 35.5 % (ref 36.5–49.3)
HGB BLD-MCNC: 11.3 G/DL (ref 12–17)
IMM GRANULOCYTES # BLD AUTO: 0.05 THOUSAND/UL (ref 0–0.2)
IMM GRANULOCYTES NFR BLD AUTO: 0 % (ref 0–2)
LYMPHOCYTES # BLD AUTO: 1.59 THOUSANDS/ΜL (ref 0.6–4.47)
LYMPHOCYTES NFR BLD AUTO: 13 % (ref 14–44)
MAGNESIUM SERPL-MCNC: 1.8 MG/DL (ref 1.6–2.6)
MCH RBC QN AUTO: 27 PG (ref 26.8–34.3)
MCHC RBC AUTO-ENTMCNC: 31.8 G/DL (ref 31.4–37.4)
MCV RBC AUTO: 85 FL (ref 82–98)
MONOCYTES # BLD AUTO: 1.26 THOUSAND/ΜL (ref 0.17–1.22)
MONOCYTES NFR BLD AUTO: 10 % (ref 4–12)
NEUTROPHILS # BLD AUTO: 9.12 THOUSANDS/ΜL (ref 1.85–7.62)
NEUTS SEG NFR BLD AUTO: 77 % (ref 43–75)
NRBC BLD AUTO-RTO: 0 /100 WBCS
P AXIS: 64 DEGREES
PLATELET # BLD AUTO: 152 THOUSANDS/UL (ref 149–390)
PMV BLD AUTO: 11.3 FL (ref 8.9–12.7)
POTASSIUM SERPL-SCNC: 3.8 MMOL/L (ref 3.5–5.3)
PR INTERVAL: 272 MS
QRS AXIS: -48 DEGREES
QRSD INTERVAL: 96 MS
QT INTERVAL: 376 MS
QTC INTERVAL: 447 MS
RBC # BLD AUTO: 4.19 MILLION/UL (ref 3.88–5.62)
SODIUM SERPL-SCNC: 134 MMOL/L (ref 136–145)
T WAVE AXIS: 31 DEGREES
VENTRICULAR RATE: 85 BPM
WBC # BLD AUTO: 12.09 THOUSAND/UL (ref 4.31–10.16)

## 2021-04-28 PROCEDURE — 85025 COMPLETE CBC W/AUTO DIFF WBC: CPT | Performed by: PHYSICIAN ASSISTANT

## 2021-04-28 PROCEDURE — 83036 HEMOGLOBIN GLYCOSYLATED A1C: CPT | Performed by: PHYSICIAN ASSISTANT

## 2021-04-28 PROCEDURE — 36415 COLL VENOUS BLD VENIPUNCTURE: CPT | Performed by: PHYSICIAN ASSISTANT

## 2021-04-28 PROCEDURE — 99223 1ST HOSP IP/OBS HIGH 75: CPT | Performed by: INTERNAL MEDICINE

## 2021-04-28 PROCEDURE — 83735 ASSAY OF MAGNESIUM: CPT | Performed by: PHYSICIAN ASSISTANT

## 2021-04-28 PROCEDURE — 80048 BASIC METABOLIC PNL TOTAL CA: CPT | Performed by: PHYSICIAN ASSISTANT

## 2021-04-28 PROCEDURE — 82948 REAGENT STRIP/BLOOD GLUCOSE: CPT

## 2021-04-28 PROCEDURE — 93010 ELECTROCARDIOGRAM REPORT: CPT | Performed by: INTERNAL MEDICINE

## 2021-04-28 RX ORDER — DIPHENHYDRAMINE HCL 25 MG
25 TABLET ORAL
Status: DISCONTINUED | OUTPATIENT
Start: 2021-04-28 | End: 2021-04-30 | Stop reason: HOSPADM

## 2021-04-28 RX ORDER — OXYCODONE HYDROCHLORIDE 10 MG/1
10 TABLET ORAL EVERY 4 HOURS PRN
Status: DISCONTINUED | OUTPATIENT
Start: 2021-04-28 | End: 2021-04-30 | Stop reason: HOSPADM

## 2021-04-28 RX ORDER — ATORVASTATIN CALCIUM 40 MG/1
40 TABLET, FILM COATED ORAL
Status: DISCONTINUED | OUTPATIENT
Start: 2021-04-28 | End: 2021-04-30 | Stop reason: HOSPADM

## 2021-04-28 RX ORDER — MAGNESIUM HYDROXIDE/ALUMINUM HYDROXICE/SIMETHICONE 120; 1200; 1200 MG/30ML; MG/30ML; MG/30ML
30 SUSPENSION ORAL EVERY 6 HOURS PRN
Status: DISCONTINUED | OUTPATIENT
Start: 2021-04-28 | End: 2021-04-30 | Stop reason: HOSPADM

## 2021-04-28 RX ORDER — OXYCODONE HYDROCHLORIDE 5 MG/1
5 TABLET ORAL EVERY 4 HOURS PRN
Status: DISCONTINUED | OUTPATIENT
Start: 2021-04-28 | End: 2021-04-30 | Stop reason: HOSPADM

## 2021-04-28 RX ORDER — DOXEPIN HYDROCHLORIDE 10 MG/1
10 CAPSULE ORAL
Status: DISCONTINUED | OUTPATIENT
Start: 2021-04-28 | End: 2021-04-30 | Stop reason: HOSPADM

## 2021-04-28 RX ORDER — TAMSULOSIN HYDROCHLORIDE 0.4 MG/1
0.4 CAPSULE ORAL
Status: DISCONTINUED | OUTPATIENT
Start: 2021-04-28 | End: 2021-04-30 | Stop reason: HOSPADM

## 2021-04-28 RX ORDER — ASPIRIN 81 MG/1
81 TABLET, CHEWABLE ORAL DAILY
Status: DISCONTINUED | OUTPATIENT
Start: 2021-04-28 | End: 2021-04-30 | Stop reason: HOSPADM

## 2021-04-28 RX ORDER — LIDOCAINE 50 MG/G
1 PATCH TOPICAL DAILY
Status: DISCONTINUED | OUTPATIENT
Start: 2021-04-28 | End: 2021-04-30 | Stop reason: HOSPADM

## 2021-04-28 RX ORDER — GABAPENTIN 400 MG/1
400 CAPSULE ORAL 3 TIMES DAILY
Status: DISCONTINUED | OUTPATIENT
Start: 2021-04-28 | End: 2021-04-30 | Stop reason: HOSPADM

## 2021-04-28 RX ORDER — INSULIN ASPART 100 [IU]/ML
40 INJECTION, SUSPENSION SUBCUTANEOUS
Status: DISCONTINUED | OUTPATIENT
Start: 2021-04-28 | End: 2021-04-30 | Stop reason: HOSPADM

## 2021-04-28 RX ORDER — LANOLIN ALCOHOL/MO/W.PET/CERES
6 CREAM (GRAM) TOPICAL
Status: DISCONTINUED | OUTPATIENT
Start: 2021-04-28 | End: 2021-04-30 | Stop reason: HOSPADM

## 2021-04-28 RX ORDER — ONDANSETRON 2 MG/ML
4 INJECTION INTRAMUSCULAR; INTRAVENOUS EVERY 6 HOURS PRN
Status: DISCONTINUED | OUTPATIENT
Start: 2021-04-28 | End: 2021-04-30 | Stop reason: HOSPADM

## 2021-04-28 RX ORDER — ACETAMINOPHEN 325 MG/1
975 TABLET ORAL EVERY 6 HOURS PRN
Status: DISCONTINUED | OUTPATIENT
Start: 2021-04-28 | End: 2021-04-30 | Stop reason: HOSPADM

## 2021-04-28 RX ORDER — SOTALOL HYDROCHLORIDE 80 MG/1
80 TABLET ORAL EVERY 12 HOURS
Status: DISCONTINUED | OUTPATIENT
Start: 2021-04-28 | End: 2021-04-30 | Stop reason: HOSPADM

## 2021-04-28 RX ADMIN — SOTALOL HYDROCHLORIDE 80 MG: 80 TABLET ORAL at 02:27

## 2021-04-28 RX ADMIN — SOTALOL HYDROCHLORIDE 80 MG: 80 TABLET ORAL at 12:34

## 2021-04-28 RX ADMIN — APIXABAN 5 MG: 5 TABLET, FILM COATED ORAL at 17:40

## 2021-04-28 RX ADMIN — SILVER SULFADIAZINE: 10 CREAM TOPICAL at 00:11

## 2021-04-28 RX ADMIN — ACETAMINOPHEN 975 MG: 325 TABLET, FILM COATED ORAL at 21:35

## 2021-04-28 RX ADMIN — INSULIN ASPART 40 UNITS: 100 INJECTION, SUSPENSION SUBCUTANEOUS at 07:00

## 2021-04-28 RX ADMIN — INSULIN LISPRO 2 UNITS: 100 INJECTION, SOLUTION INTRAVENOUS; SUBCUTANEOUS at 07:00

## 2021-04-28 RX ADMIN — TRAMADOL HYDROCHLORIDE 50 MG: 50 TABLET, FILM COATED ORAL at 00:11

## 2021-04-28 RX ADMIN — MELATONIN TAB 3 MG 6 MG: 3 TAB at 21:36

## 2021-04-28 RX ADMIN — DICLOFENAC SODIUM TOPICAL GEL, 1%, 2 G: 10 GEL TOPICAL at 17:41

## 2021-04-28 RX ADMIN — CEFTRIAXONE SODIUM 1000 MG: 10 INJECTION, POWDER, FOR SOLUTION INTRAVENOUS at 11:14

## 2021-04-28 RX ADMIN — APIXABAN 5 MG: 5 TABLET, FILM COATED ORAL at 08:13

## 2021-04-28 RX ADMIN — INSULIN LISPRO 4 UNITS: 100 INJECTION, SOLUTION INTRAVENOUS; SUBCUTANEOUS at 11:14

## 2021-04-28 RX ADMIN — DIPHENHYDRAMINE HYDROCHLORIDE 25 MG: 25 TABLET ORAL at 04:15

## 2021-04-28 RX ADMIN — INSULIN LISPRO 2 UNITS: 100 INJECTION, SOLUTION INTRAVENOUS; SUBCUTANEOUS at 21:35

## 2021-04-28 RX ADMIN — ATORVASTATIN CALCIUM 40 MG: 40 TABLET, FILM COATED ORAL at 17:40

## 2021-04-28 RX ADMIN — DICLOFENAC SODIUM TOPICAL GEL, 1%, 2 G: 10 GEL TOPICAL at 21:35

## 2021-04-28 RX ADMIN — INSULIN LISPRO 4 UNITS: 100 INJECTION, SOLUTION INTRAVENOUS; SUBCUTANEOUS at 16:56

## 2021-04-28 RX ADMIN — GABAPENTIN 400 MG: 400 CAPSULE ORAL at 17:39

## 2021-04-28 RX ADMIN — INSULIN ASPART 40 UNITS: 100 INJECTION, SUSPENSION SUBCUTANEOUS at 17:52

## 2021-04-28 RX ADMIN — DIPHENHYDRAMINE HYDROCHLORIDE 25 MG: 25 TABLET ORAL at 21:36

## 2021-04-28 RX ADMIN — MELATONIN TAB 3 MG 6 MG: 3 TAB at 02:26

## 2021-04-28 RX ADMIN — DOXEPIN HYDROCHLORIDE 10 MG: 10 CAPSULE ORAL at 21:35

## 2021-04-28 RX ADMIN — TAMSULOSIN HYDROCHLORIDE 0.4 MG: 0.4 CAPSULE ORAL at 17:40

## 2021-04-28 RX ADMIN — Medication 1 TABLET: at 08:13

## 2021-04-28 RX ADMIN — GABAPENTIN 400 MG: 400 CAPSULE ORAL at 21:36

## 2021-04-28 RX ADMIN — ATORVASTATIN CALCIUM 40 MG: 40 TABLET, FILM COATED ORAL at 02:26

## 2021-04-28 RX ADMIN — ASPIRIN 81 MG: 81 TABLET, CHEWABLE ORAL at 08:13

## 2021-04-28 RX ADMIN — INSULIN LISPRO 2 UNITS: 100 INJECTION, SOLUTION INTRAVENOUS; SUBCUTANEOUS at 02:26

## 2021-04-28 RX ADMIN — GABAPENTIN 400 MG: 400 CAPSULE ORAL at 08:12

## 2021-04-28 RX ADMIN — APIXABAN 5 MG: 5 TABLET, FILM COATED ORAL at 02:27

## 2021-04-28 NOTE — PLAN OF CARE
Problem: MUSCULOSKELETAL - ADULT  Goal: Maintain or return mobility to safest level of function  Description: INTERVENTIONS:  - Assess patient's ability to carry out ADLs; assess patient's baseline for ADL function and identify physical deficits which impact ability to perform ADLs (bathing, care of mouth/teeth, toileting, grooming, dressing, etc )  - Assess/evaluate cause of self-care deficits   - Assess range of motion  - Assess patient's mobility  - Assess patient's need for assistive devices and provide as appropriate  - Encourage maximum independence but intervene and supervise when necessary  - Involve family in performance of ADLs  - Assess for home care needs following discharge   - Consider OT consult to assist with ADL evaluation and planning for discharge  - Provide patient education as appropriate  Outcome: Progressing  Goal: Maintain proper alignment of affected body part  Description: INTERVENTIONS:  - Support, maintain and protect limb and body alignment  - Provide patient/ family with appropriate education  Outcome: Progressing

## 2021-04-28 NOTE — UTILIZATION REVIEW
Inpatient Admission Authorization Request   NOTIFICATION OF INPATIENT ADMISSION/INPATIENT AUTHORIZATION REQUEST   SERVICING FACILITY:   [address] [place] [address]  Tax ID: [id number]  NPI: [id number]  Place of Service: Inpatient [address] [facility]  [address]  Place of Service Code: 24     ATTENDING PROVIDER:  Attending Name and NPI#: Leroy Leah, [place] [[id number]]  Address: [address] [place] [address]  Phone: [phone]     UTILIZATION REVIEW CONTACT:  [facility] Utilization   Network Utilization Review Department  Phone: [phone]  Fax [phone]  Email: [email or web address]     PHYSICIAN ADVISORY SERVICES:  FOR [id number] REVIEW - MEDICAL NECESSITY DENIAL  Phone: [phone]  Fax: [phone]  Email: [email or web address]     TYPE OF REQUEST:  Inpatient Status     ADMISSION INFORMATION:  ADMISSION DATE/TIME: 4/27/21 2324  PATIENT DIAGNOSIS CODE/DESCRIPTION:  Urinary retention [R33 9]  DISCHARGE DATE/TIME: No discharge date for patient encounter  DISCHARGE DISPOSITION (IF DISCHARGED): Non [facility] Acute Care/Short Term Hosp     IMPORTANT INFORMATION:  Please contact the [facility] directly with any questions or concerns regarding this request  Department voicemails are confidential     Send requests for admission clinical reviews, concurrent reviews, approvals, and administrative denials due to lack of clinical to fax [phone]

## 2021-04-28 NOTE — H&P
[address] 1960, 62 y o  male MRN: [id number]  Unit/Bed#: ED 26 Encounter: [id number]  Primary Care Provider: Cyndie Villeda MD   Date and time admitted to hospital: 4/27/2021  8:17 PM    * Acute cystitis without hematuria  Assessment & Plan  · Reports difficulty urinating x 2 days  With (+) frequency and inability to fully empty bladder  Denies hematuria  (+) fever/chills since evening prior to admission  (+) suprapubic pain  (+) decreased appetite  · Temp 102 on admission  WBC 15  12  LA 1 4  · UA revealed > 1000 glucose  Trace blood  (-) Nitrite  4-10 RBC  10-20 WBC and Innumerable bacteria  · Received dose of Cefepime in ED  No previous urine cultures available  · Will switch to Rocephin pending culture results    Urinary retention  Assessment & Plan  · States has not had prescription for Flomax since last admitted to hospital  Now with urinary retention which may be 2/2 UTI, or may be the cause of UTI  · Will restart Flomax in am  · PVR q shift with urinary retention protocol  · Treatment of cystitis as above    Type 2 diabetes mellitus with hyperglycemia, with long-term current use of insulin (HCC)  Assessment & Plan  Lab Results   Component Value Date    HGBA1C 7 9 (H) 04/26/2020       Recent Labs     04/28/21  0125   POCGLU 187*       Blood Sugar Average: Last 72 hrs:  (P) 187     · Continue home dose 70/30 insulin 40 units TID AC  · FSBS AC & HS w/ SSI  · Diabetic diet    Chronic atrial fibrillation (HCC)  Assessment & Plan  · Rate controlled  · Continue home dose Eliquis and Sotalol    Chronic left-sided low back pain with sciatica  Assessment & Plan  · Continue home dose Neurontin      VTE Prophylaxis: Apixaban (Eliquis)  / sequential compression device   Code Status: Level 1 Full Code  POLST: POLST form is not discussed and not completed at this time    Discussion with family: NA    Anticipated Length of Stay:  Patient will be admitted on an Inpatient basis with an anticipated length of stay of  Greater than 2 midnights  Justification for Hospital Stay: See AP above    Total Time for Visit, including Counseling / Coordination of Care: 45 minutes  Greater than 50% of this total time spent on direct patient counseling and coordination of care  Chief Complaint:   Difficulty urinating    History of Present Illness:    Celestine Stacy is a 62 y o  male who presents with difficulty urinating x 2 days  With (+) frequency and inability to fully empty bladder  Denies hematuria  (+) fever/chills since evening prior to admission  (+) suprapubic pain  (+) decreased appetite  Review of Systems:    Review of Systems   Constitutional: Positive for appetite change (decreased), chills and fever  HENT: Positive for sore throat  Negative for congestion, ear pain, sinus pressure and trouble swallowing  Eyes: Negative for visual disturbance  Respiratory: Negative for cough, shortness of breath and wheezing  Cardiovascular: Negative for chest pain, palpitations and leg swelling  Gastrointestinal: Positive for abdominal pain (suprapubic)  Negative for constipation, diarrhea, nausea and vomiting  Genitourinary: Positive for decreased urine volume, difficulty urinating and frequency  Negative for dysuria and hematuria  Musculoskeletal: Negative for neck pain and neck stiffness  Neurological: Negative for dizziness, syncope, light-headedness and headaches  All other systems reviewed and are negative        Past Medical and Surgical History:     Past Medical History:   Diagnosis Date    [facility]fib [facility]     Cancer ([facility] )     Left leg    Diabetes 1 5, managed as type 2 ([facility] )     Dysphagia     Difficulty finding word    High cholesterol     HTN (hypertension)     Stroke (cerebrum) ([facility] ) 04/06/2020       Past Surgical History:   Procedure Laterality Date    BACK SURGERY      x3 total back in [address] Left        Meds/Allergies:    Prior to Admission medications    Medication Sig Start Date End Date Taking? Authorizing Provider   acetaminophen (TYLENOL) 500 mg tablet Take 500 mg by mouth every 6 (six) hours as needed for mild pain   Yes Historical Provider, MD   aspirin 81 mg chewable tablet Chew 81 mg daily 4/14/20  Yes Historical Provider, MD   atorvastatin (LIPITOR) 40 mg tablet Take 40 mg by mouth 5/4/20  Yes Historical Provider, MD   ELIQUIS 5 MG Take 5 mg by mouth 2 (two) times a day 5/5/20  Yes Historical Provider, MD   gabapentin (NEURONTIN) 300 mg capsule Take 400 mg by mouth 3 (three) times a day  3/24/20  Yes Historical Provider, MD   multivitamin-iron-minerals-folic acid (THERAPEUTIC-M) TABS tablet Take by mouth   Yes Historical Provider, MD   NOVOLOG MIX 70/30 (70-30) 100 UNIT/ML injection  6/10/20  Yes Historical Provider, MD   oxyCODONE (ROXICODONE) 5 mg immediate release tablet Take 5 mg by mouth 2/21/21  Yes Historical Provider, MD   sotalol (BETAPACE) 80 mg tablet Take 80 mg by mouth every 12 (twelve) hours 9/19/20  Yes Historical Provider, MD   tamsulosin (FLOMAX) 0 4 mg Take 0 4 mg by mouth 10/8/19   Historical Provider, MD     I have reviewed home medications with patient personally      Allergies: No Known Allergies    Social History:     Marital Status: /Civil Union   Patient Pre-hospital Living Situation: Lives w wife  Patient Pre-hospital Level of Mobility: Ambulatory with crutches/walker  Patient Pre-hospital Diet Restrictions: None  Substance Use History:   Social History     Substance and Sexual Activity   Alcohol Use Not Currently     Social History     Tobacco Use   Smoking Status Never Smoker   Smokeless Tobacco Never Used     Social History     Substance and Sexual Activity   Drug Use Never       Family History:    Family History   Problem Relation Age of Onset    Stroke Mother     Coronary artery disease Mother     Diabetes Mother     Stroke Father     Coronary artery disease Father     Diabetes Father Physical Exam:     Vitals:   Blood Pressure: 132/59 (04/28/21 0300)  Pulse: 60 (04/28/21 0300)  Temperature: 98 5 °F (36 9 °C) (04/27/21 2345)  Temp Source: Oral (04/27/21 2345)  Respirations: 20 (04/28/21 0200)  SpO2: 97 % (04/28/21 0300)    Physical Exam  Vitals signs reviewed  Constitutional:       General: He is not in acute distress  HENT:      Head: Normocephalic and atraumatic  Mouth/Throat:      Mouth: Mucous membranes are dry  Eyes:      Extraocular Movements: Extraocular movements intact  Neck:      Musculoskeletal: Normal range of motion and neck supple  Cardiovascular:      Rate and Rhythm: Normal rate  Pulses: Normal pulses  Heart sounds: Normal heart sounds  No murmur  No friction rub  No gallop  Pulmonary:      Effort: Pulmonary effort is normal  No respiratory distress  Breath sounds: Normal breath sounds  No wheezing or rhonchi  Abdominal:      Palpations: Abdomen is soft  Tenderness: There is abdominal tenderness (suprapubic)  There is no guarding or rebound  Musculoskeletal: Normal range of motion  General: No tenderness  Right lower leg: Edema (trace) present  Left lower leg: Edema (trace) present  Skin:     General: Skin is warm and dry  Neurological:      General: No focal deficit present  Mental Status: He is alert and oriented to person, place, and time  Comments: Expressive aphasia chronically since CVA   Psychiatric:         Mood and Affect: Mood normal          Behavior: Behavior normal          Thought Content: Thought content normal          Additional Data:     Lab Results: I have personally reviewed pertinent reports        Results from last 7 days   Lab Units 04/27/21  2108   WBC Thousand/uL 15 12*   HEMOGLOBIN g/dL 13 8   HEMATOCRIT % 43 0   PLATELETS Thousands/uL 210   NEUTROS PCT % 84*   LYMPHS PCT % 8*   MONOS PCT % 7   EOS PCT % 0     Results from last 7 days   Lab Units 04/27/21  2108   SODIUM mmol/L 131*   POTASSIUM mmol/L 4 3   CHLORIDE mmol/L 96*   CO2 mmol/L 27   BUN mg/dL 19   CREATININE mg/dL 1 27   ANION GAP mmol/L 8   CALCIUM mg/dL 8 9   ALBUMIN g/dL 3 1*   TOTAL BILIRUBIN mg/dL 0 96   ALK PHOS U/L 80   ALT U/L 36   AST U/L 31   GLUCOSE RANDOM mg/dL 247*         Results from last 7 days   Lab Units 04/28/21  0125   POC GLUCOSE mg/dl 187*         Results from last 7 days   Lab Units 04/27/21  2108   LACTIC ACID mmol/L 1 4       Imaging: I have personally reviewed pertinent reports  and I have personally reviewed pertinent films in PACS    CT abdomen pelvis w contrast   Final Result by Danny Ignacio MD (04/27 6018)      Findings consistent with cystitis            Workstation performed: [id number]         CT recon only lumbar spine   Final Result by Danny Ignacio MD (04/27 6940)      No fracture or traumatic subluxation  Workstation performed: [id number]         XR femur 2 views LEFT    (Results Pending)   XR hip/pelv 2-3 vws left if performed    (Results Pending)       EKG, Pathology, and Other Studies Reviewed on Admission:   · EKG: NA    Allscripts / Epic Records Reviewed: Yes     ** Please Note: This note has been constructed using a voice recognition system   **

## 2021-04-28 NOTE — UTILIZATION REVIEW
Initial Clinical Review    Admission: Date/Time/Statement:   Admission Orders (From admission, onward)     Ordered        04/27/21 2324  Inpatient Admission  Once                   Orders Placed This Encounter   Procedures    Inpatient Admission     Standing Status:   Standing     Number of Occurrences:   1     Order Specific Question:   Level of Care     Answer:   Med Surg [16]     Order Specific Question:   Estimated length of stay     Answer:   More than 2 Midnights     Order Specific Question:   Certification     Answer:   I certify that inpatient services are medically necessary for this patient for a duration of greater than two midnights  See H&P and MD Progress Notes for additional information about the patient's course of treatment  ED Arrival Information     Expected Arrival Acuity Means of Arrival Escorted By Service Admission Type    - 4/27/2021 18:29 Urgent Wheelchair Spouse General Medicine Urgent    Arrival Complaint    Urinary Retention        Chief Complaint   Patient presents with    Urinary Retention     difficulty urinating, hx of enlarged prostate  +burning with urination  started yesterday AM       Initial Presentation: 63 yo male to ED from home w/ difficulty urinating x2 days  + freq and inability to empty bladder  + fever and chills , + suprapubic pain and dec appetite   Found to have a temp 102, wbc 15, LA 1 4 Admitted IP status w/ acute cystitis w/o hematuria plan for IV abx , f/u ua cx  Restart flomax , PVR qs, DM SSI and monitor   PE : abd tenderness , tr BLE edema     Date:4/28   Day 2: admitted w/ acute cystitis ua w/ leukocytes and innumerable bacteria   Cont ceftriaxone , f/u ua cx and BC   Hx chronic afib rate control, cont sotalol and eliquis       ED Triage Vitals   Temperature Pulse Respirations Blood Pressure SpO2   04/27/21 1840 04/27/21 1839 04/27/21 1839 04/27/21 1839 04/27/21 1839   (!) 102 °F (38 9 °C) 85 15 119/58 96 %      Temp Source Heart Rate Source Patient Position - Orthostatic VS BP Location FiO2 (%)   04/27/21 1840 04/27/21 1839 04/27/21 2154 04/27/21 1839 --   Oral Monitor Lying Right arm       Pain Score       04/27/21 2138       Worst Possible Pain          Wt Readings from Last 1 Encounters:   03/07/21 (!) 140 kg (309 lb 11 9 oz)     Additional Vital Signs:   04/28/21 0700  98 5 °F (36 9 °C)  62  20  140/63  91  99 %  None (Room air)  Sitting   04/28/21 0300  --  60  --  132/59  85  97 %  --  --   04/28/21 0200  --  63  20  125/57  82  97 %  None (Room air)  Lying   04/28/21 0000  --  64  27Abnormal   156/72  103  98 %  --  --   04/27/21 2345  98 5 °F (36 9 °C)  --  --  --  --  --  --  --   04/27/21 2330  --  66  18  134/65  93  97 %  --  --   04/27/21 2200  --  79  20  147/78  108  97 %  None (Room air)  Lying   04/27/21 2154  --  79  20  143/80  102  97 %  None (Room air)  Lying   04/27/21 1840  102 °F (38 9 °C)Abnormal   --  --  --  --  --           Pertinent Labs/Diagnostic Test Results:   4/27 CT abd   Findings consistent with cystitis   4/27 CT lumbar spine   No fracture or traumatic subluxation     4/27 L hip pelvis No acute osseous abnormality    Additional findings, as described    4/27 L femur No acute osseous abnormality    Results from last 7 days   Lab Units 04/27/21 2125   SARS-COV-2  Negative     Results from last 7 days   Lab Units 04/28/21 0416 04/27/21  2108   WBC Thousand/uL 12 09* 15 12*   HEMOGLOBIN g/dL 11 3* 13 8   HEMATOCRIT % 35 5* 43 0   PLATELETS Thousands/uL 152 210   NEUTROS ABS Thousands/µL 9 12* 12 65*     Results from last 7 days   Lab Units 04/28/21  0416 04/27/21  2108   SODIUM mmol/L 134* 131*   POTASSIUM mmol/L 3 8 4 3   CHLORIDE mmol/L 99* 96*   CO2 mmol/L 28 27   ANION GAP mmol/L 7 8   BUN mg/dL 21 19   CREATININE mg/dL 1 23 1 27   EGFR ml/min/1 73sq m 63 61   CALCIUM mg/dL 7 9* 8 9   MAGNESIUM mg/dL 1 8  --      Results from last 7 days   Lab Units 04/27/21  2108   AST U/L 31   ALT U/L 36   ALK PHOS U/L 80   TOTAL PROTEIN g/dL 8 9*   ALBUMIN g/dL 3 1*   TOTAL BILIRUBIN mg/dL 0 96   BILIRUBIN DIRECT mg/dL 0 20     Results from last 7 days   Lab Units 04/28/21  1111 04/28/21  0816 04/28/21  0125   POC GLUCOSE mg/dl 200* 193* 187*     Results from last 7 days   Lab Units 04/28/21  0416 04/27/21  2108   GLUCOSE RANDOM mg/dL 187* 247*     Results from last 7 days   Lab Units 04/28/21  0416   HEMOGLOBIN A1C % 7 2*   EAG mg/dl 160     Results from last 7 days   Lab Units 04/27/21  2108   TROPONIN I ng/mL <0 02     Results from last 7 days   Lab Units 04/27/21  2108   LACTIC ACID mmol/L 1 4     Results from last 7 days   Lab Units 04/27/21  2108   LIPASE u/L 29*     Results from last 7 days   Lab Units 04/27/21  2151   CLARITY UA  Clear   COLOR UA  Dk Yellow   SPEC GRAV UA  1 020   PH UA  5 0   GLUCOSE UA mg/dl >=1000 (1%)*   KETONES UA mg/dl Trace*   BLOOD UA  Trace-Intact*   PROTEIN UA mg/dl Negative   NITRITE UA  Negative   BILIRUBIN UA  Negative   UROBILINOGEN UA E U /dl 1 0   LEUKOCYTES UA  Elevated glucose may cause decreased leukocyte values  See urine microscopic for [facility] result/*   WBC UA /hpf 10-20*   RBC UA /hpf 4-10*   BACTERIA UA /hpf Innumerable*   EPITHELIAL CELLS WET PREP /hpf Occasional     Results from last 7 days   Lab Units 04/27/21 2108   BLOOD CULTURE  Received in Microbiology Lab  Culture in Progress  Received in Microbiology Lab  Culture in Progress       ED Treatment:   Medication Administration from 04/27/2021 1829 to 04/28/2021 1257       Date/Time Order Dose Route Action     04/27/2021 2142 lactated ringers bolus 1,000 mL 1,000 mL Intravenous New Bag     04/27/2021 2138 ketorolac (TORADOL) injection 15 mg 15 mg Intravenous Given     04/27/2021 2148 acetaminophen (TYLENOL) tablet 650 mg 650 mg Oral Given     04/27/2021 2141 HYDROmorphone (DILAUDID) injection 0 5 mg 0 5 mg Intravenous Given     04/27/2021 2252 cefepime (MAXIPIME) 2,000 mg in dextrose 5 % 50 mL IVPB 2,000 mg Intravenous New Bag     04/28/2021 0011 silver sulfadiazine (SILVADENE,SSD) 1 % cream   Topical Given     04/28/2021 0011 traMADol (ULTRAM) tablet 50 mg 50 mg Oral Given     04/28/2021 0226 atorvastatin (LIPITOR) tablet 40 mg 40 mg Oral Given     04/28/2021 0813 apixaban (ELIQUIS) tablet 5 mg 5 mg Oral Given     04/28/2021 0227 apixaban (ELIQUIS) tablet 5 mg 5 mg Oral Given     04/28/2021 2167 gabapentin (NEURONTIN) capsule 400 mg 400 mg Oral Given     04/28/2021 0813 multivitamin-minerals (CENTRUM) tablet 1 tablet 1 tablet Oral Given     04/28/2021 0700 insulin aspart protamine-insulin aspart (NovoLOG 70/30) 100 units/mL subcutaneous injection 40 Units 40 Units Subcutaneous Given     04/28/2021 1234 sotalol (BETAPACE) tablet 80 mg 80 mg Oral Given     04/28/2021 0227 sotalol (BETAPACE) tablet 80 mg 80 mg Oral Given     04/28/2021 0813 aspirin chewable tablet 81 mg 81 mg Oral Given     04/28/2021 1114 insulin lispro (HumaLOG) 100 units/mL subcutaneous injection 2-12 Units 4 Units Subcutaneous Given     04/28/2021 0700 insulin lispro (HumaLOG) 100 units/mL subcutaneous injection 2-12 Units 2 Units Subcutaneous Given     04/28/2021 0226 insulin lispro (HumaLOG) 100 units/mL subcutaneous injection 2-12 Units 2 Units Subcutaneous Given     04/28/2021 1114 ceftriaxone (ROCEPHIN) 1 g/50 mL in dextrose IVPB 1,000 mg Intravenous New Bag     04/28/2021 0226 melatonin tablet 6 mg 6 mg Oral Given     04/28/2021 0415 diphenhydrAMINE (BENADRYL) tablet 25 mg 25 mg Oral Given        Past Medical History:   Diagnosis Date    [facility]fib ([facility] )     Cancer ([facility] )     Left leg    Diabetes 1 5, managed as type 2 ([facility] )     Dysphagia     Difficulty finding word    High cholesterol     HTN (hypertension)     Stroke (cerebrum) ([facility] ) 04/06/2020     Present on Admission:   Acute cystitis without hematuria   Urinary retention   Chronic atrial fibrillation (HCC)   Chronic left-sided low back pain with sciatica      Admitting Diagnosis: Urinary retention [R33 9]  Age/Sex: 62 y o  male  Admission Orders:  Scheduled Medications:  apixaban, 5 mg, Oral, BID  aspirin, 81 mg, Oral, Daily  atorvastatin, 40 mg, Oral, Daily With Dinner  cefTRIAXone, 1,000 mg, Intravenous, Q24H  Diclofenac Sodium, 2 g, Topical, 4x Daily  doxepin, 10 mg, Oral, HS  gabapentin, 400 mg, Oral, TID  insulin aspart protamine-insulin aspart, 40 Units, Subcutaneous, TID AC  insulin lispro, 2-12 Units, Subcutaneous, TID AC  insulin lispro, 2-12 Units, Subcutaneous, HS  lidocaine, 1 patch, Topical, Daily  melatonin, 6 mg, Oral, HS  multivitamin-minerals, 1 tablet, Oral, Daily  sotalol, 80 mg, Oral, Q12H  tamsulosin, 0 4 mg, Oral, Daily With Dinner      Continuous IV Infusions:     PRN Meds:  acetaminophen, 975 mg, Oral, Q6H PRN  aluminum-magnesium hydroxide-simethicone, 30 mL, Oral, Q6H PRN  diphenhydrAMINE, 25 mg, Oral, HS PRN  ondansetron, 4 mg, Intravenous, Q6H PRN  oxyCODONE, 10 mg, Oral, Q4H PRN  oxyCODONE, 5 mg, Oral, Q4H PRN    Measure post void residual   Fingerstick ac and hs   Tele   Bladder scan       Network Utilization Review Department  ATTENTION: Please call with any questions or concerns to [phone] and carefully listen to the prompts so that you are directed to the right person  All voicemails are confidential   Savage Ramos all requests for admission clinical reviews, approved or denied determinations and any other requests to dedicated fax number below belonging to the campus where the patient is receiving treatment   List of dedicated fax numbers for the Facilities:  [address] DENIALS (Administrative/Medical Necessity) [phone]   [address] [place] [address] (Maternity/NICU/Pediatrics) 261 [facility] [place] [address] [facility] Elisabet Garcia [address] [place] [address] Antonieta Lopez 1481 [address] [facility] [phone]

## 2021-04-28 NOTE — ED NOTES
Pt told EDT uses walker at home, experiences neuropathy pain in right leg and moderate pain in left leg   EDT obtained a walker for pt to use in the department        Lele Lynn RN  04/27/21 2024

## 2021-04-28 NOTE — ASSESSMENT & PLAN NOTE
Lab Results   Component Value Date    HGBA1C 7 9 (H) 04/26/2020       Recent Labs     04/28/21  0125   POCGLU 187*       Blood Sugar Average: Last 72 hrs:  (P) 187     · Continue home dose 70/30 insulin 40 units TID AC  · FSBS AC & HS w/ SSI  · Diabetic diet

## 2021-04-28 NOTE — ASSESSMENT & PLAN NOTE
· States has not had prescription for Flomax since last admitted to hospital  Now with urinary retention which may be 2/2 UTI, or may be the cause of UTI    · Will restart Flomax in am  · PVR q shift with urinary retention protocol  · Treatment of cystitis as above

## 2021-04-28 NOTE — PLAN OF CARE
Problem: Potential for Falls  Goal: Patient will remain free of falls  Description: INTERVENTIONS:  - Assess patient frequently for physical needs  -  Identify cognitive and physical deficits and behaviors that affect risk of falls    -  [place] fall precautions as indicated by assessment   - Educate patient/family on patient safety including physical limitations  - Instruct patient to call for assistance with activity based on assessment  - Modify environment to reduce risk of injury  - Consider OT/PT consult to assist with strengthening/mobility  Outcome: Progressing     Problem: Prexisting or High Potential for Compromised Skin Integrity  Goal: Skin integrity is maintained or improved  Description: INTERVENTIONS:  - Identify patients at risk for skin breakdown  - Assess and monitor skin integrity  - Assess and monitor nutrition and hydration status  - Monitor labs   - Assess for incontinence   - Turn and reposition patient  - Assist with mobility/ambulation  - Relieve pressure over bony prominences  - Avoid friction and shearing  - Provide appropriate hygiene as needed including keeping skin clean and dry  - Evaluate need for skin moisturizer/barrier cream  - Collaborate with interdisciplinary team   - Patient/family teaching  - Consider wound care consult   Outcome: Progressing     Problem: PAIN - ADULT  Goal: Verbalizes/displays adequate comfort level or baseline comfort level  Description: Interventions:  - Encourage patient to monitor pain and request assistance  - Assess pain using appropriate pain scale  - Administer analgesics based on type and severity of pain and evaluate response  - Implement non-pharmacological measures as appropriate and evaluate response  - Consider cultural and social influences on pain and pain management  - Notify physician/advanced practitioner if interventions unsuccessful or patient reports new pain  Outcome: Progressing     Problem: INFECTION - ADULT  Goal: Absence or prevention of progression during hospitalization  Description: INTERVENTIONS:  - Assess and monitor for signs and symptoms of infection  - Monitor lab/diagnostic results  - Monitor all insertion sites, i e  indwelling lines, tubes, and drains  - Monitor endotracheal if appropriate and nasal secretions for changes in amount and color  - [place] appropriate cooling/warming therapies per order  - Administer medications as ordered  - Instruct and encourage patient and family to use good hand hygiene technique  - Identify and instruct in appropriate isolation precautions for identified infection/condition  Outcome: Progressing     Problem: SAFETY ADULT  Goal: Patient will remain free of falls  Description: INTERVENTIONS:  - Assess patient frequently for physical needs  -  Identify cognitive and physical deficits and behaviors that affect risk of falls    -  [place] fall precautions as indicated by assessment   - Educate patient/family on patient safety including physical limitations  - Instruct patient to call for assistance with activity based on assessment  - Modify environment to reduce risk of injury  - Consider OT/PT consult to assist with strengthening/mobility  Outcome: Progressing  Goal: Maintain or return to baseline ADL function  Description: INTERVENTIONS:  -  Assess patient's ability to carry out ADLs; assess patient's baseline for ADL function and identify physical deficits which impact ability to perform ADLs (bathing, care of mouth/teeth, toileting, grooming, dressing, etc )  - Assess/evaluate cause of self-care deficits   - Assess range of motion  - Assess patient's mobility; develop plan if impaired  - Assess patient's need for assistive devices and provide as appropriate  - Encourage maximum independence but intervene and supervise when necessary  - Involve family in performance of ADLs  - Assess for home care needs following discharge   - Consider OT consult to assist with ADL evaluation and planning for discharge  - Provide patient education as appropriate  Outcome: Progressing  Goal: Maintain or return mobility status to optimal level  Description: INTERVENTIONS:  - Assess patient's baseline mobility status (ambulation, transfers, stairs, etc )    - Identify cognitive and physical deficits and behaviors that affect mobility  - Identify mobility aids required to assist with transfers and/or ambulation (gait belt, sit-to-stand, lift, walker, cane, etc )  - [place] fall precautions as indicated by assessment  - Record patient progress and toleration of activity level on Mobility SBAR; progress patient to next Phase/Stage  - Instruct patient to call for assistance with activity based on assessment  - Consider rehabilitation consult to assist with strengthening/weightbearing, etc   Outcome: Progressing     Problem: DISCHARGE PLANNING  Goal: Discharge to home or other facility with appropriate resources  Description: INTERVENTIONS:  - Identify barriers to discharge w/patient and caregiver  - Arrange for needed discharge resources and transportation as appropriate  - Identify discharge learning needs (meds, wound care, etc )  - Arrange for interpretive services to assist at discharge as needed  - Refer to Case Management Department for coordinating discharge planning if the patient needs post-hospital services based on physician/advanced practitioner order or complex needs related to functional status, cognitive ability, or social support system  Outcome: Progressing     Problem: Knowledge Deficit  Goal: Patient/family/caregiver demonstrates understanding of disease process, treatment plan, medications, and discharge instructions  Description: Complete learning assessment and assess knowledge base    Interventions:  - Provide teaching at level of understanding  - Provide teaching via preferred learning methods  Outcome: Progressing

## 2021-04-28 NOTE — ASSESSMENT & PLAN NOTE
· Reports difficulty urinating x 2 days  With (+) frequency and inability to fully empty bladder  Denies hematuria  (+) fever/chills since evening prior to admission  (+) suprapubic pain  (+) decreased appetite  · Temp 102 on admission  WBC 15  12  LA 1 4  · UA revealed > 1000 glucose  Trace blood  (-) Nitrite  4-10 RBC  10-20 WBC and Innumerable bacteria  · Received dose of Cefepime in ED   No previous urine cultures available  · Will switch to Rocephin pending culture results

## 2021-04-28 NOTE — ED PROVIDER NOTES
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, hx L4/L5 laminectomies complicated by hardware infections & osteomyelitis, on chronic abx through 2/2021, s/p spinal cord stimulator arriving for evaluation of fever and urinary retention  Patient reports that he felt feverish last night, though no documented fevers at home  He most recently took Tylenol around 11am today  He reports dysuria, urgency and frequency beginning yesterday morning  He admits to decreased urine output with symptom onset with associated suprapubic discomfort  He also admits to right-sided abdominal pain and flank pain  He has not appreciated any hematuria  He admits to feeling weak, with decreased po intake/appetite today  No known sick contact  On previous hospital admission on 3/9/21, the patient was transferred from [facility] for critical LLE ischemia, undergoing abd aortogram & LLE angiogram at [facility] w/ stent to the left superior femoral artery and angioplasty of R sup  fem artery; subsequent in-stent thrombosis, returning to the OR for thrombectomy and stent placement  Patient denies any significant pain or issues with the left lower extremity noting that the wound is healing well  His hospital course at that time was complicated by post-op urinary retention s/p Hauser catheter placement  History provided by:  Patient      Prior to Admission Medications   Prescriptions Last Dose Informant Patient Reported? Taking?    ELIQUIS 5 MG 4/27/2021 at Unknown time  Yes Yes   Sig: Take 5 mg by mouth 2 (two) times a day   NOVOLOG MIX 70/30 (70-30) 100 UNIT/ML injection 4/27/2021 at Unknown time  Yes Yes   acetaminophen (TYLENOL) 500 mg tablet 4/27/2021 at Unknown time  Yes Yes   Sig: Take 500 mg by mouth every 6 (six) hours as needed for mild pain   aspirin 81 mg chewable tablet 4/27/2021 at Unknown time  Yes Yes   Sig: Chew 81 mg daily   atorvastatin (LIPITOR) 40 mg tablet 4/27/2021 at Unknown time  Yes Yes   Sig: Take 40 mg by mouth   gabapentin (NEURONTIN) 300 mg capsule 4/27/2021 at Unknown time Self Yes Yes   Sig: Take 400 mg by mouth 3 (three) times a day    multivitamin-iron-minerals-folic acid (THERAPEUTIC-M) TABS tablet 4/27/2021 at Unknown time  Yes Yes   Sig: Take by mouth   oxyCODONE (ROXICODONE) 5 mg immediate release tablet Past Month at Unknown time  Yes Yes   Sig: Take 5 mg by mouth   sotalol (BETAPACE) 80 mg tablet 4/27/2021 at Unknown time  Yes Yes   Sig: Take 80 mg by mouth every 12 (twelve) hours   tamsulosin (FLOMAX) 0 4 mg Not Taking at Unknown time  Yes No   Sig: Take 0 4 mg by mouth      Facility-Administered Medications: None       Past Medical History:   Diagnosis Date    A-fib ([facility] )     Cancer ([facility] )     Left leg    Diabetes 1 5, managed as type 2 ([facility] )     Dysphagia     Difficulty finding word    High cholesterol     HTN (hypertension)     Stroke (cerebrum) ([facility] ) 04/06/2020       Past Surgical History:   Procedure Laterality Date    BACK SURGERY      x3 total back in 1999, 1996, and [id number] [facility] Left        Family History   Problem Relation Age of Onset    Stroke Mother     Coronary artery disease Mother     Diabetes Mother     Stroke Father     Coronary artery disease Father     Diabetes Father      I have reviewed and agree with the history as documented  E-Cigarette/Vaping     E-Cigarette/Vaping Substances     Social History     Tobacco Use    Smoking status: Never Smoker    Smokeless tobacco: Never Used   Substance Use Topics    Alcohol use: Not Currently    Drug use: Never       Review of Systems   Constitutional: Positive for appetite change and fever  Respiratory: Negative for shortness of breath  Cardiovascular: Negative for chest pain  Gastrointestinal: Positive for abdominal pain  Negative for blood in stool, constipation, diarrhea and vomiting  Genitourinary: Positive for decreased urine volume, difficulty urinating, dysuria, flank pain and frequency  Negative for hematuria  Skin: Negative for rash  Neurological: Positive for weakness  All other systems reviewed and are negative  Physical Exam  Physical Exam  Vitals signs and nursing note reviewed  Constitutional:       General: He is not in acute distress  Appearance: Normal appearance  He is well-developed  He is not ill-appearing, toxic-appearing or diaphoretic  Comments: Elevated BMI   HENT:      Head: Normocephalic and atraumatic  Eyes:      Conjunctiva/sclera: Conjunctivae normal       Pupils: Pupils are equal, round, and reactive to light  Neck:      Musculoskeletal: Full passive range of motion without pain, normal range of motion and neck supple  Normal range of motion  No erythema, neck rigidity or pain with movement  Cardiovascular:      Rate and Rhythm: Normal rate and regular rhythm  Pulses:           Dorsalis pedis pulses are 2+ on the right side and 2+ on the left side  Heart sounds: Normal heart sounds  Comments: Palpable 2+ DP pulses, capillary refill and distal sensation intact   Pulmonary:      Effort: Pulmonary effort is normal  No respiratory distress  Breath sounds: Normal breath sounds  No wheezing  Abdominal:      General: Bowel sounds are normal  There is no distension  Palpations: Abdomen is soft  Tenderness: There is abdominal tenderness in the suprapubic area  There is no guarding or rebound  Comments: Suprapubic and right-sided abdominal tenderness to palpation  No peritoneal signs  Musculoskeletal: Normal range of motion  General: No tenderness  Comments: Surgical scars to left lower extremity noted   Skin:     General: Skin is warm and dry        Capillary Refill: Capillary refill takes less than 2 seconds  Findings: No bruising, ecchymosis or petechiae  Comments: Necrosis of plantar surface of left 2nd toe   Neurological:      General: No focal deficit present  Mental Status: He is alert  Mental status is at baseline  GCS: GCS eye subscore is 4  GCS verbal subscore is 5  GCS motor subscore is 6  Sensory: Sensation is intact  No sensory deficit  Motor: Motor function is intact  No abnormal muscle tone           Vital Signs  ED Triage Vitals   Temperature Pulse Respirations Blood Pressure SpO2   04/27/21 1840 04/27/21 1839 04/27/21 1839 04/27/21 1839 04/27/21 1839   (!) 102 °F (38 9 °C) 85 15 119/58 96 %      Temp Source Heart Rate Source Patient Position - Orthostatic VS BP Location FiO2 (%)   04/27/21 1840 04/27/21 1839 04/27/21 2154 04/27/21 1839 --   Oral Monitor Lying Right arm       Pain Score       04/27/21 2138       Worst Possible Pain           Vitals:    04/27/21 1839 04/27/21 2154 04/27/21 2200   BP: 119/58 143/80 147/78   Pulse: 85 79 79   Patient Position - Orthostatic VS:  Lying Lying         Visual Acuity      ED Medications  Medications   silver sulfadiazine (SILVADENE,SSD) 1 % cream (has no administration in time range)   lactated ringers bolus 1,000 mL (1,000 mL Intravenous New Bag 4/27/21 2142)   ketorolac (TORADOL) injection 15 mg (15 mg Intravenous Given 4/27/21 2138)   acetaminophen (TYLENOL) tablet 650 mg (650 mg Oral Given 4/27/21 2148)   HYDROmorphone (DILAUDID) injection 0 5 mg (0 5 mg Intravenous Given 4/27/21 2141)   iohexol (OMNIPAQUE) 350 MG/ML injection (SINGLE-DOSE) 100 mL (100 mL Intravenous Given 4/27/21 2157)   cefepime (MAXIPIME) 2,000 mg in dextrose 5 % 50 mL IVPB (2,000 mg Intravenous New Bag 4/27/21 2252)       Diagnostic Studies  Results Reviewed     Procedure Component Value Units Date/Time    Novel Coronavirus (Covid-19),PCR [facility] - 2 Hour Stat [[id number]]  (Normal) Collected: 04/27/21 2125    Lab Status: Final result Specimen: Nares from Nose Updated: 04/27/21 2223     SARS-CoV-2 Negative    Narrative: The specimen collection materials, transport medium, and/or testing methodology utilized in the production of these test results have been proven to be reliable in a limited validation with an abbreviated program under the Emergency Utilization Authorization provided by the FDA  Testing reported as "Presumptive positive" will be confirmed with secondary testing to ensure result accuracy  Clinical caution and judgement should be used with the interpretation of these results with consideration of the clinical impression and other laboratory testing  Testing reported as "Positive" or "Negative" has been proven to be accurate according to standard laboratory validation requirements  All testing is performed with control materials showing appropriate reactivity at standard intervals  Urine Microscopic [[id number]]  (Abnormal) Collected: 04/27/21 2151    Lab Status: Final result Specimen: Urine, Clean Catch Updated: 04/27/21 2219     RBC, UA 4-10 /hpf      WBC, UA 10-20 /hpf      Epithelial Cells Occasional /hpf      Bacteria, UA Innumerable /hpf     UA (URINE) with reflex to Scope [[id number]]  (Abnormal) Collected: 04/27/21 2151    Lab Status: Final result Specimen: Urine, Clean Catch Updated: 04/27/21 2205     Color, UA Dk Yellow     Clarity, UA Clear     Specific [place], UA 1 020     pH, UA 5 0     Leukocytes, UA Elevated glucose may cause decreased leukocyte values  See urine microscopic for [facility] result/     Nitrite, UA Negative     Protein, UA Negative mg/dl      Glucose, UA >=1000 (1%) mg/dl      Ketones, UA Trace mg/dl      Urobilinogen, UA 1 0 E U /dl      Bilirubin, UA Negative     Blood, UA Trace-Intact    Urine culture [[id number]] Collected: 04/27/21 2151    Lab Status:  In process Specimen: Urine, Clean Catch Updated: 04/27/21 2159    Lactic acid [[id number]]  (Normal) Collected: 04/27/21 2108    Lab Status: Final result Specimen: Blood from Arm, Right Updated: 04/27/21 2138     LACTIC ACID 1 4 mmol/L     Narrative:      Result may be elevated if tourniquet was used during collection      Troponin I [[id number]]  (Normal) Collected: 04/27/21 2108    Lab Status: Final result Specimen: Blood from Arm, Right Updated: 04/27/21 2135     Troponin I <0 02 ng/mL     Basic metabolic panel [[id number]]  (Abnormal) Collected: 04/27/21 2108    Lab Status: Final result Specimen: Blood from Arm, Right Updated: 04/27/21 2133     Sodium 131 mmol/L      Potassium 4 3 mmol/L      Chloride 96 mmol/L      CO2 27 mmol/L      ANION GAP 8 mmol/L      BUN 19 mg/dL      Creatinine 1 27 mg/dL      Glucose 247 mg/dL      Calcium 8 9 mg/dL      eGFR 61 ml/min/1 73sq m     Narrative:      [place] guidelines for Chronic Kidney Disease (CKD):     Stage 1 with normal or high GFR (GFR > 90 mL/min/1 73 square meters)    Stage 2 Mild CKD (GFR = 60-89 mL/min/1 73 square meters)    Stage 3A Moderate CKD (GFR = 45-59 mL/min/1 73 square meters)    Stage 3B Moderate CKD (GFR = 30-44 mL/min/1 73 square meters)    Stage 4 Severe CKD (GFR = 15-29 mL/min/1 73 square meters)    Stage 5 End Stage CKD (GFR <15 mL/min/1 73 square meters)  Note: GFR calculation is accurate only with a steady state creatinine    Hepatic function panel [[id number]]  (Abnormal) Collected: 04/27/21 2108    Lab Status: Final result Specimen: Blood from Arm, Right Updated: 04/27/21 2133     Total Bilirubin 0 96 mg/dL      Bilirubin, Direct 0 20 mg/dL      Alkaline Phosphatase 80 U/L      AST 31 U/L      ALT 36 U/L      Total Protein 8 9 g/dL      Albumin 3 1 g/dL     Lipase [[id number]]  (Abnormal) Collected: 04/27/21 2108    Lab Status: Final result Specimen: Blood from Arm, Right Updated: 04/27/21 2133     Lipase 29 u/L     CBC and differential [[id number]]  (Abnormal) Collected: 04/27/21 2108    Lab Status: Final result Specimen: Blood from Arm, Right Updated: 04/27/21 2117     WBC 15 12 Thousand/uL      RBC 5 07 Million/uL      Hemoglobin 13 8 g/dL      Hematocrit 43 0 %      MCV 85 fL      MCH 27 2 pg      MCHC 32 1 g/dL      RDW 14 6 %      MPV 11 2 fL      Platelets 979 Thousands/uL      nRBC 0 /100 WBCs      Neutrophils Relative 84 %      Immat GRANS % 1 %      Lymphocytes Relative 8 %      Monocytes Relative 7 %      Eosinophils Relative 0 %      Basophils Relative 0 %      Neutrophils Absolute 12 65 Thousands/µL      Immature Grans Absolute 0 12 Thousand/uL      Lymphocytes Absolute 1 22 Thousands/µL      Monocytes Absolute 1 05 Thousand/µL      Eosinophils Absolute 0 03 Thousand/µL      Basophils Absolute 0 05 Thousands/µL     Blood culture #1 [[id number]] Collected: 04/27/21 2108    Lab Status: In process Specimen: Blood from Arm, Right Updated: 04/27/21 2114    Blood culture #2 [[id number]] Collected: 04/27/21 2108    Lab Status: In process Specimen: Blood from Arm, Right Updated: 04/27/21 2114                 CT abdomen pelvis w contrast   Final Result by Magda Cao MD (04/27 2256)      Findings consistent with cystitis            Workstation performed: [id number]         CT recon only lumbar spine   Final Result by Magda Cao MD (04/27 2328)      No fracture or traumatic subluxation  Workstation performed: [id number]         XR femur 2 views LEFT    (Results Pending)   XR hip/pelv 2-3 vws left if performed    (Results Pending)              Procedures  Procedures         ED Course  ED Course as of Apr 27 2359   Tue Apr 27, 2021 2200 LACTIC ACID: 1 4 2358 Temperature: 98 5 °F (36 9 °C)                         Initial Sepsis Screening     Row Name 04/27/21 2322                Is the patient's history suggestive of a new or worsening infection? (!) Yes (Proceed)  -JF        Suspected source of infection  urinary tract infection  -JF        Are two or more of the following signs & symptoms of infection both present and new to the patient? (!) Yes (Proceed)  -JF        Indicate SIRS criteria  Hyperthemia > 38 3C (100 9F); Leukocytosis (WBC > [address] IJL)  -JF        If the answer is yes to both questions, suspicion of sepsis is present  --        If severe sepsis is present AND tissue hypoperfusion perists in the hour after fluid resuscitation or lactate > 4, the patient meets criteria for SEPTIC SHOCK  --        Are any of the following organ dysfunction criteria present within 6 hours of suspected infection and SIRS criteria that are NOT considered to be chronic conditions? --        Organ dysfunction  --        Date of presentation of severe sepsis  --        Time of presentation of severe sepsis  --        Tissue hypoperfusion persists in the hour after crystalloid fluid administration, evidenced, by either:  --        Was hypotension present within one hour of the conclusion of crystalloid fluid administration?  --        Date of presentation of septic shock  --        Time of presentation of septic shock  --          User Key  (r) = Recorded By, (t) = Taken By, (c) = Cosigned By    [facility], [facility] [address]  Name Provider Type    [address] [place] [address] RITU Burden Physician Assistant                        MDM  Number of Diagnoses or Management Options  Cystitis: new and requires workup  Sepsis [facility]): new and requires workup  Urinary retention: new and requires workup  Urinary tract infection:   Diagnosis management comments: This is a 60-year-old male with numerous comorbid conditions arriving ambulatory to the emergency department accompanied by spouse for evaluation fever and acute urinary retention  The patient admits to dysuria, urgency, frequency, decreased urine output, abdominal pain, flank pain, decreased appetite and weakness  Temp 102F oral on arrival  He has no associated chest pain or shortness of breath  Differential diagnosis includes but not limited to:  Evaluate for acute intra-abdominal pathology;  Acute urinary retention, cystitis, pyelonephritis, nephrolithiasis, UTI; check imaging of thoracolumbar spine for acute abnormalities    Initial ED plan:  Labs, imaging, UA, fluids, abx, hospital admission    Final ED Assessment:  Vital signs on hospital arrival notable for temperature of 102° F oral for which antipyretics for administered, examination as documented above  All labs and imaging independently reviewed with imaging interpreted by the radiologist  Labs notable for leukocytosis 15 12, lactate within normal, troponin negative  CT abdomen/pelvis reports findings consistent with cystitis  CT recon L spine reports degenerative changes, no fx/traumatic subluxation  Urinalysis confirms urinary tract infection  The patient had been provided IV fluids, IV antibiotics, anti-pyretics and pain control during hospital course  The patient and spouse were advised of all lab and imaging results with recommendation for hospital admission for further treatment and management with which the patient was understanding and agreeable with  The case had been discussed with Christian TAYLOR, patient accepted to Medicine Service  The patient had appeared comfortable and in no significant distress on evaluations  Temperature had improved on recheck  He had remained hemodynamically stable and he is stable for hospital admission  Bridging orders placed            Amount and/or Complexity of Data Reviewed  Clinical lab tests: ordered and reviewed  Tests in the radiology section of CPT®: ordered and reviewed  Review and summarize past medical records: yes  Independent visualization of images, tracings, or specimens: yes    Risk of Complications, Morbidity, and/or Mortality  Presenting problems: moderate  Diagnostic procedures: moderate  Management options: moderate    Patient Progress  Patient progress: stable              Disposition  Final diagnoses:   Sepsis ([address] )   Cystitis   Urinary retention   Urinary tract infection     Time reflects when diagnosis was documented in both MDM as applicable and the Disposition within this note     Time User Action Codes Description Comment    4/27/2021 11:24 PM Le Dk Add [A41 9] Sepsis ([address] )     4/27/2021 11:24 PM Le Dk Add [N30 90] Cystitis       ED Disposition     ED Disposition Condition Date/Time Comment    Admit Stable Tue Apr 27, 2021 11:23 PM Case was discussed with Sanjay Chirinos and the patient's admission status was agreed to be Admission Status: inpatient status to the service of Dr Lilian Calles   Follow-up Information    None         Patient's Medications   Discharge Prescriptions    No medications on file     No discharge procedures on file      PDMP Review     None          ED Provider  Electronically Signed by           Hugh Varma PA-C  05/08/21 [id number]

## 2021-04-29 LAB
ANION GAP SERPL CALCULATED.3IONS-SCNC: 8 MMOL/L (ref 4–13)
BASOPHILS # BLD AUTO: 0.02 THOUSANDS/ΜL (ref 0–0.1)
BASOPHILS NFR BLD AUTO: 0 % (ref 0–1)
BUN SERPL-MCNC: 14 MG/DL (ref 5–25)
CALCIUM SERPL-MCNC: 8.1 MG/DL (ref 8.3–10.1)
CHLORIDE SERPL-SCNC: 102 MMOL/L (ref 100–108)
CO2 SERPL-SCNC: 28 MMOL/L (ref 21–32)
CREAT SERPL-MCNC: 1 MG/DL (ref 0.6–1.3)
EOSINOPHIL # BLD AUTO: 0.1 THOUSAND/ΜL (ref 0–0.61)
EOSINOPHIL NFR BLD AUTO: 2 % (ref 0–6)
ERYTHROCYTE [DISTWIDTH] IN BLOOD BY AUTOMATED COUNT: 14 % (ref 11.6–15.1)
GFR SERPL CREATININE-BSD FRML MDRD: 81 ML/MIN/1.73SQ M
GLUCOSE SERPL-MCNC: 133 MG/DL (ref 65–140)
GLUCOSE SERPL-MCNC: 169 MG/DL (ref 65–140)
GLUCOSE SERPL-MCNC: 178 MG/DL (ref 65–140)
GLUCOSE SERPL-MCNC: 234 MG/DL (ref 65–140)
GLUCOSE SERPL-MCNC: 275 MG/DL (ref 65–140)
HCT VFR BLD AUTO: 39.3 % (ref 36.5–49.3)
HGB BLD-MCNC: 12.5 G/DL (ref 12–17)
IMM GRANULOCYTES # BLD AUTO: 0.03 THOUSAND/UL (ref 0–0.2)
IMM GRANULOCYTES NFR BLD AUTO: 1 % (ref 0–2)
LYMPHOCYTES # BLD AUTO: 1.13 THOUSANDS/ΜL (ref 0.6–4.47)
LYMPHOCYTES NFR BLD AUTO: 17 % (ref 14–44)
MCH RBC QN AUTO: 27.1 PG (ref 26.8–34.3)
MCHC RBC AUTO-ENTMCNC: 31.8 G/DL (ref 31.4–37.4)
MCV RBC AUTO: 85 FL (ref 82–98)
MONOCYTES # BLD AUTO: 0.71 THOUSAND/ΜL (ref 0.17–1.22)
MONOCYTES NFR BLD AUTO: 11 % (ref 4–12)
NEUTROPHILS # BLD AUTO: 4.62 THOUSANDS/ΜL (ref 1.85–7.62)
NEUTS SEG NFR BLD AUTO: 69 % (ref 43–75)
NRBC BLD AUTO-RTO: 0 /100 WBCS
PLATELET # BLD AUTO: 185 THOUSANDS/UL (ref 149–390)
PMV BLD AUTO: 11.8 FL (ref 8.9–12.7)
POTASSIUM SERPL-SCNC: 3.7 MMOL/L (ref 3.5–5.3)
RBC # BLD AUTO: 4.62 MILLION/UL (ref 3.88–5.62)
SODIUM SERPL-SCNC: 138 MMOL/L (ref 136–145)
WBC # BLD AUTO: 6.61 THOUSAND/UL (ref 4.31–10.16)

## 2021-04-29 PROCEDURE — 99232 SBSQ HOSP IP/OBS MODERATE 35: CPT | Performed by: INTERNAL MEDICINE

## 2021-04-29 PROCEDURE — 82948 REAGENT STRIP/BLOOD GLUCOSE: CPT

## 2021-04-29 PROCEDURE — 80048 BASIC METABOLIC PNL TOTAL CA: CPT | Performed by: INTERNAL MEDICINE

## 2021-04-29 PROCEDURE — 85025 COMPLETE CBC W/AUTO DIFF WBC: CPT | Performed by: INTERNAL MEDICINE

## 2021-04-29 RX ADMIN — Medication 1 TABLET: at 08:37

## 2021-04-29 RX ADMIN — INSULIN ASPART 40 UNITS: 100 INJECTION, SUSPENSION SUBCUTANEOUS at 18:31

## 2021-04-29 RX ADMIN — ATORVASTATIN CALCIUM 40 MG: 40 TABLET, FILM COATED ORAL at 16:35

## 2021-04-29 RX ADMIN — INSULIN LISPRO 6 UNITS: 100 INJECTION, SOLUTION INTRAVENOUS; SUBCUTANEOUS at 17:31

## 2021-04-29 RX ADMIN — INSULIN ASPART 40 UNITS: 100 INJECTION, SUSPENSION SUBCUTANEOUS at 08:42

## 2021-04-29 RX ADMIN — INSULIN ASPART 40 UNITS: 100 INJECTION, SUSPENSION SUBCUTANEOUS at 13:01

## 2021-04-29 RX ADMIN — LIDOCAINE 5% 1 PATCH: 700 PATCH TOPICAL at 08:38

## 2021-04-29 RX ADMIN — APIXABAN 5 MG: 5 TABLET, FILM COATED ORAL at 08:37

## 2021-04-29 RX ADMIN — APIXABAN 5 MG: 5 TABLET, FILM COATED ORAL at 17:31

## 2021-04-29 RX ADMIN — DIPHENHYDRAMINE HYDROCHLORIDE 25 MG: 25 TABLET ORAL at 22:55

## 2021-04-29 RX ADMIN — SOTALOL HYDROCHLORIDE 80 MG: 80 TABLET ORAL at 08:37

## 2021-04-29 RX ADMIN — GABAPENTIN 400 MG: 400 CAPSULE ORAL at 08:38

## 2021-04-29 RX ADMIN — ASPIRIN 81 MG: 81 TABLET, CHEWABLE ORAL at 08:38

## 2021-04-29 RX ADMIN — INSULIN LISPRO 4 UNITS: 100 INJECTION, SOLUTION INTRAVENOUS; SUBCUTANEOUS at 13:02

## 2021-04-29 RX ADMIN — DICLOFENAC SODIUM TOPICAL GEL, 1%, 2 G: 10 GEL TOPICAL at 22:57

## 2021-04-29 RX ADMIN — DICLOFENAC SODIUM TOPICAL GEL, 1%, 2 G: 10 GEL TOPICAL at 08:42

## 2021-04-29 RX ADMIN — MELATONIN TAB 3 MG 6 MG: 3 TAB at 22:54

## 2021-04-29 RX ADMIN — CEFTRIAXONE SODIUM 1000 MG: 10 INJECTION, POWDER, FOR SOLUTION INTRAVENOUS at 11:52

## 2021-04-29 RX ADMIN — GABAPENTIN 400 MG: 400 CAPSULE ORAL at 16:35

## 2021-04-29 RX ADMIN — DICLOFENAC SODIUM TOPICAL GEL, 1%, 2 G: 10 GEL TOPICAL at 17:31

## 2021-04-29 RX ADMIN — DOXEPIN HYDROCHLORIDE 10 MG: 10 CAPSULE ORAL at 22:55

## 2021-04-29 RX ADMIN — SILVER SULFADIAZINE: 10 CREAM TOPICAL at 15:12

## 2021-04-29 RX ADMIN — GABAPENTIN 400 MG: 400 CAPSULE ORAL at 22:56

## 2021-04-29 RX ADMIN — TAMSULOSIN HYDROCHLORIDE 0.4 MG: 0.4 CAPSULE ORAL at 16:35

## 2021-04-29 RX ADMIN — DICLOFENAC SODIUM TOPICAL GEL, 1%, 2 G: 10 GEL TOPICAL at 12:03

## 2021-04-29 RX ADMIN — SOTALOL HYDROCHLORIDE 80 MG: 80 TABLET ORAL at 22:54

## 2021-04-29 RX ADMIN — INSULIN LISPRO 2 UNITS: 100 INJECTION, SOLUTION INTRAVENOUS; SUBCUTANEOUS at 22:58

## 2021-04-29 RX ADMIN — INSULIN LISPRO 2 UNITS: 100 INJECTION, SOLUTION INTRAVENOUS; SUBCUTANEOUS at 08:43

## 2021-04-29 NOTE — PROGRESS NOTES
[facility]  Progress Note - Arjun Oz 1960, 62 y o  male MRN: [id number]  Unit/Bed#: -02 Encounter: [id number]  Primary Care Provider: Namrata Erickson MD   Date and time admitted to hospital: 4/27/2021  8:17 PM    Chronic atrial fibrillation ([address] )  Assessment & Plan  · Rate controlled  · Continue home dose Eliquis and Sotalol    Type 2 diabetes mellitus with hyperglycemia, with long-term current use of insulin [facility])  Assessment & Plan  Lab Results   Component Value Date    HGBA1C 7 2 (H) 04/28/2021       Recent Labs     04/28/21  1352 04/28/21  1612 04/28/21 2052 04/29/21  0726   POCGLU 234* 233* 166* 169*       Blood Sugar Average: Last 72 hrs:  (P) 201 5     · Continue home dose 70/30 insulin 40 units TID AC  · FSBS AC & HS w/ SSI  · Diabetic diet    Urinary retention  Assessment & Plan  · States has not had prescription for Flomax since last admitted to hospital  Now with urinary retention which may be 2/2 UTI, or may be the cause of UTI  · Will restart Flomax in am  · PVR q shift with urinary retention protocol  · Treatment of cystitis as above    Chronic left-sided low back pain with sciatica  Assessment & Plan  · Continue home dose Neurontin    * Acute cystitis without hematuria  Assessment & Plan  · Reports difficulty urinating x 2 days  With (+) frequency and inability to fully empty bladder  Denies hematuria  (+) fever/chills since evening prior to admission  (+) suprapubic pain  (+) decreased appetite  · Temp 102 on admission  WBC 15  12  LA 1 4  · UA revealed > 1000 glucose  Trace blood  (-) Nitrite  4-10 RBC  10-20 WBC and Innumerable bacteria  · Received dose of Cefepime in ED   No previous urine cultures available  · WBC has normalized  · Follow-up urine culture sensitivities narrow antibiotics  · Currently afebrile  · Continue ceftriaxone for now      VTE Pharmacologic Prophylaxis:   Pharmacologic: Apixaban (Eliquis)  Mechanical VTE Prophylaxis in Place: Yes    Patient Centered Rounds: I have performed bedside rounds with nursing staff today  Discussions with Specialists or Other Care Team Provider: cm, nursing    Education and Discussions with Family / Patient: pt    Time Spent for Care: 30 minutes  More than 50% of total time spent on counseling and coordination of care as described above  Current Length of Stay: 2 day(s)    Current Patient Status: Inpatient   Certification Statement: The patient will continue to require additional inpatient hospital stay due to Still requiring IV antibiotic    Discharge Plan:  Likely will be medically stable for discharge tomorrow    Code Status: Level 1 - Full Code      Subjective:   No acute complaints    Objective:     Vitals:   Temp (24hrs), Av 5 °F (36 9 °C), Min:98 °F (36 7 °C), Max:99 °F (37 2 °C)    Temp:  [98 °F (36 7 °C)-99 °F (37 2 °C)] 98 °F (36 7 °C)  HR:  [60-65] 60  Resp:  [18-20] 18  BP: (111-147)/(59-81) 116/80  SpO2:  [96 %-98 %] 98 %  Body mass index is 38 58 kg/m²  Input and Output Summary (last 24 hours): Intake/Output Summary (Last 24 hours) at 2021 1112  Last data filed at 2021 0900  Gross per 24 hour   Intake 480 ml   Output 2225 ml   Net -1745 ml       Physical Exam:     Physical Exam  Constitutional:       General: He is not in acute distress  Appearance: He is well-developed  He is not diaphoretic  HENT:      Head: Normocephalic and atraumatic  Nose: Nose normal       Mouth/Throat:      Pharynx: No oropharyngeal exudate  Eyes:      General: No scleral icterus  Conjunctiva/sclera: Conjunctivae normal    Neck:      Musculoskeletal: Normal range of motion  Vascular: No JVD  Cardiovascular:      Rate and Rhythm: Normal rate and regular rhythm  Heart sounds: Normal heart sounds  No murmur  No friction rub  No gallop  Pulmonary:      Effort: Pulmonary effort is normal  No respiratory distress  Breath sounds: Normal breath sounds   No wheezing or rales  Chest:      Chest wall: No tenderness  Abdominal:      General: Bowel sounds are normal  There is no distension  Palpations: Abdomen is soft  Tenderness: There is no abdominal tenderness  There is no guarding  Musculoskeletal: Normal range of motion  General: No tenderness or deformity  Lymphadenopathy:      Cervical: No cervical adenopathy  Skin:     General: Skin is warm and dry  Findings: No erythema  Neurological:      Mental Status: He is alert  Mental status is at baseline  Cranial Nerves: No cranial nerve deficit  Coordination: Coordination normal       Deep Tendon Reflexes: Reflexes normal          Additional Data:     Labs:    Results from last 7 days   Lab Units 04/29/21  0503   WBC Thousand/uL 6 61   HEMOGLOBIN g/dL 12 5   HEMATOCRIT % 39 3   PLATELETS Thousands/uL 185   NEUTROS PCT % 69   LYMPHS PCT % 17   MONOS PCT % 11   EOS PCT % 2     Results from last 7 days   Lab Units 04/29/21  0503  04/27/21  2108   SODIUM mmol/L 138   < > 131*   POTASSIUM mmol/L 3 7   < > 4 3   CHLORIDE mmol/L 102   < > 96*   CO2 mmol/L 28   < > 27   BUN mg/dL 14   < > 19   CREATININE mg/dL 1 00   < > 1 27   ANION GAP mmol/L 8   < > 8   CALCIUM mg/dL 8 1*   < > 8 9   ALBUMIN g/dL  --   --  3 1*   TOTAL BILIRUBIN mg/dL  --   --  0 96   ALK PHOS U/L  --   --  80   ALT U/L  --   --  36   AST U/L  --   --  31   GLUCOSE RANDOM mg/dL 133   < > 247*    < > = values in this interval not displayed  Results from last 7 days   Lab Units 04/29/21  0726 04/28/21  2052 04/28/21  1612 04/28/21  1352 04/28/21  1316 04/28/21  1111 04/28/21  0816 04/28/21  0125   POC GLUCOSE mg/dl 169* 166* 233* 234* 230* 200* 193* 187*     Results from last 7 days   Lab Units 04/28/21  0416   HEMOGLOBIN A1C % 7 2*     Results from last 7 days   Lab Units 04/27/21  2108   LACTIC ACID mmol/L 1 4           * I Have Reviewed All Lab Data Listed Above  * Additional Pertinent Lab Tests Reviewed:  All Labs Within Last 24 Hours Reviewed    Imaging:    Imaging Reports Reviewed Today Include: na  Imaging Personally Reviewed by Myself Includes:  na    Recent Cultures (last 7 days):     Results from last 7 days   Lab Units 04/27/21 2151 04/27/21 2108   BLOOD CULTURE   --  No Growth at 24 hrs  No Growth at 24 hrs     URINE CULTURE  >100,000 cfu/ml Klebsiella-Enterobacter  group*  --        Last 24 Hours Medication List:   Current Facility-Administered Medications   Medication Dose Route Frequency Provider Last Rate    acetaminophen  975 mg Oral Q6H PRN Rene Palms, PA-C      aluminum-magnesium hydroxide-simethicone  30 mL Oral Q6H PRN Rene Palms, PA-C      apixaban  5 mg Oral BID [facility], PA-C      aspirin  81 mg Oral Daily [place], [place]      atorvastatin  40 mg Oral Daily With K[facility] [facility]      cefTRIAXone  1,000 mg Intravenous Q24H Rene Palms, PA-C 1,000 mg (04/28/21 1114)    Diclofenac Sodium  2 g Topical 4x Daily Shawn Chacon MD      diphenhydrAMINE  25 mg Oral HS PRN Rene Palms, RITU      doxepin  10 mg Oral HS Shawn Chacon MD      gabapentin  400 mg Oral TID [facility], PA-FRANK      insulin aspart protamine-insulin aspart  40 Units Subcutaneous TID AC Ara Mosher PA-C      insulin lispro  2-12 Units Subcutaneous TID AC Ara Mosher PA-C      insulin lispro  2-12 Units Subcutaneous HS Ara Mosher PA-C      lidocaine  1 patch Topical Daily Gladys Romero MD      melatonin  6 mg Oral HS [facility], RITU      multivitamin-minerals  1 tablet Oral Daily [place], [place]      ondansetron  4 mg Intravenous Q6H PRN Rene Palms, PA-C      oxyCODONE  10 mg Oral Q4H PRN Rene Palms, PA-FRANK      oxyCODONE  5 mg Oral Q4H PRN Rene Palms, PA-FRANK      sotalol  80 mg Oral [address] [place], [place]      tamsulosin  0 4 mg Oral Daily With Pau Tang PA-C          Today, Patient Was Seen By: Gladys Romero MD    ** Please Note: Dictation voice to text software may have been used in the creation of this document   **

## 2021-04-29 NOTE — DISCHARGE INSTR - OTHER ORDERS
Skin care plans:  1-Hydraguard to bilateral sacrum, buttock and heels BID and PRN  2-Elevate heels to offload pressure  3-Ehob cushion in chair when out of bed  4-Moisturize skin daily with skin nourishing cream   5-Turn/reposition q2h or when medically stable for pressure re-distribution on skin  6- Left toe, black eschars- Following outpatient wound orders: Cleanse toes with NSS, pat dry thoroughly  Apply Silvadene cream to black eschars, cover with 4x4 gauze, and secure with tape  Change daily and Prn for soilage or dislodgement

## 2021-04-29 NOTE — ASSESSMENT & PLAN NOTE
· Reports difficulty urinating x 2 days  With (+) frequency and inability to fully empty bladder  Denies hematuria  (+) fever/chills since evening prior to admission  (+) suprapubic pain  (+) decreased appetite  · Temp 102 on admission  WBC 15  12  LA 1 4  · UA revealed > 1000 glucose  Trace blood  (-) Nitrite  4-10 RBC  10-20 WBC and Innumerable bacteria  · Received dose of Cefepime in ED   No previous urine cultures available  · WBC has normalized  · Follow-up urine culture sensitivities narrow antibiotics  · Currently afebrile  · Continue ceftriaxone for now

## 2021-04-29 NOTE — WOUND OSTOMY CARE
Progress Note - Wound   Tuan Search 62 y o  male MRN: [id number]  Unit/Bed#: -02 Encounter: [id number]      Assessment:   Patient admitted due to acute cystitis without hematuria  History of a fib, cancer, diabetes, HTN, and stroke  Wound care consulted for left toe wounds  Patient agreeable to assessment, alert and oriented x4, assist of 1 to stand with walker for assessment, continent of bowel and bladder, mcgregor catheter currently in place, OOB to chair, heels elevated, independent with most ADL's, and wife assists patient with wound care at home  Patient goes to out patient wound care center in [place], will continue with current treatment plan for left toe wounds  Per patient, wounds have been improving with this wound care plan  Patient also states he has been following vascular and had stent placed in left leg to aide in re-vascularization for wound healing to continue  1  Left second toe wound- Suspect etiology to be diabetic ulcer  There are multiple scattered areas of dry and intact black eschar noted to the great toe, second toe, the third toe under the nail, and the fifth toe  Most prominent wound is the second toe, which is the largest  No induration, fluctuance, or lifting of the eschars  True depth of wounds are unknown due to devitalized tissue present  Madisyn-wound is dry, intact, no redness  2  Right foot and bilateral heels are dry, intact, no wounds, no redness  3  Bilateral sacrum and buttock are dry, intact, blanchable pink  Educated patient on importance of frequent offloading of pressure via turning, repositioning, and weight-shifting  Verbalized understanding of plan of care  No induration, fluctuance, odor, warmth, redness, or purulence noted to the above noted wound  New dressings applied  Patient tolerated well, denies pain to the wound  Primary nurse aware of plan of care  See flow sheets for more detailed assessment findings  Will follow along        Skin care plans:  1-Hydraguard to bilateral sacrum, buttock and heels BID and PRN  2-Elevate heels to offload pressure  3-Ehob cushion in chair when out of bed  4-Moisturize skin daily with skin nourishing cream   5-Turn/reposition q2h or when medically stable for pressure re-distribution on skin  6- Left toe, black eschars- Following outpatient wound orders: Cleanse toes with NSS, pat dry thoroughly  Apply Silvadene cream to black eschars, cover with 4x4 gauze, and secure with tape  Change daily and Prn for soilage or dislodgement  Wound 04/29/21 Foot Anterior; Left (Active)   Wound Image   04/29/21 1112   Wound Description Intact;Dry;Brown;Black 04/29/21 1112   Madisyn-wound Assessment Dry; Intact 04/29/21 1112   Wound Length (cm) 2 5 cm 04/29/21 1112   Wound Width (cm) 1 2 cm 04/29/21 1112   Wound Depth (cm) 0 cm 04/29/21 1112   Wound Surface Area (cm^2) 3 cm^2 04/29/21 1112   Wound Volume (cm^3) 0 cm^3 04/29/21 1112   Calculated Wound Volume (cm^3) 0 cm^3 04/29/21 1112   Tunneling 0 cm 04/29/21 1112   Undermining 0 04/29/21 1112   Drainage Amount None 04/29/21 1112   Non-staged Wound Description Not applicable 33/63/54 5937   Treatments Cleansed;Site care 04/29/21 1112   Dressing Other (Comment);Dry dressing 04/29/21 1112   Wound packed?  No 04/29/21 1112       Call or tigertext with any questions  Wound Care will continue to follow    Natalia Cadet RN BSN  Wound care

## 2021-04-29 NOTE — ASSESSMENT & PLAN NOTE
Lab Results   Component Value Date    HGBA1C 7 2 (H) 04/28/2021       Recent Labs     04/28/21  1352 04/28/21  1612 04/28/21 2052 04/29/21  0726   POCGLU 234* 233* 166* 169*       Blood Sugar Average: Last 72 hrs:  (P) 201 5     · Continue home dose 70/30 insulin 40 units TID AC  · FSBS AC & HS w/ SSI  · Diabetic diet

## 2021-04-30 VITALS
HEART RATE: 62 BPM | SYSTOLIC BLOOD PRESSURE: 123 MMHG | BODY MASS INDEX: 38.38 KG/M2 | TEMPERATURE: 99.6 F | HEIGHT: 75 IN | DIASTOLIC BLOOD PRESSURE: 63 MMHG | RESPIRATION RATE: 17 BRPM | OXYGEN SATURATION: 95 % | WEIGHT: 308.64 LBS

## 2021-04-30 LAB
BACTERIA UR CULT: ABNORMAL
GLUCOSE SERPL-MCNC: 168 MG/DL (ref 65–140)
GLUCOSE SERPL-MCNC: 271 MG/DL (ref 65–140)

## 2021-04-30 PROCEDURE — 82948 REAGENT STRIP/BLOOD GLUCOSE: CPT

## 2021-04-30 PROCEDURE — 99239 HOSP IP/OBS DSCHRG MGMT >30: CPT | Performed by: INTERNAL MEDICINE

## 2021-04-30 RX ORDER — DOXEPIN HYDROCHLORIDE 10 MG/1
10 CAPSULE ORAL
Qty: 30 CAPSULE | Refills: 0 | Status: SHIPPED | OUTPATIENT
Start: 2021-04-30

## 2021-04-30 RX ORDER — CEPHALEXIN 500 MG/1
500 CAPSULE ORAL EVERY 6 HOURS SCHEDULED
Status: DISCONTINUED | OUTPATIENT
Start: 2021-04-30 | End: 2021-04-30 | Stop reason: HOSPADM

## 2021-04-30 RX ORDER — LIDOCAINE 50 MG/G
1 PATCH TOPICAL DAILY
Qty: 30 PATCH | Refills: 0 | Status: SHIPPED | OUTPATIENT
Start: 2021-04-30

## 2021-04-30 RX ORDER — CEPHALEXIN 500 MG/1
500 CAPSULE ORAL EVERY 6 HOURS SCHEDULED
Qty: 20 CAPSULE | Refills: 0 | Status: SHIPPED | OUTPATIENT
Start: 2021-04-30 | End: 2021-05-05

## 2021-04-30 RX ADMIN — LIDOCAINE 5% 1 PATCH: 700 PATCH TOPICAL at 09:03

## 2021-04-30 RX ADMIN — SOTALOL HYDROCHLORIDE 80 MG: 80 TABLET ORAL at 09:02

## 2021-04-30 RX ADMIN — INSULIN ASPART 40 UNITS: 100 INJECTION, SUSPENSION SUBCUTANEOUS at 09:04

## 2021-04-30 RX ADMIN — INSULIN ASPART 40 UNITS: 100 INJECTION, SUSPENSION SUBCUTANEOUS at 12:40

## 2021-04-30 RX ADMIN — DICLOFENAC SODIUM TOPICAL GEL, 1%, 2 G: 10 GEL TOPICAL at 09:04

## 2021-04-30 RX ADMIN — CEPHALEXIN 500 MG: 500 CAPSULE ORAL at 09:08

## 2021-04-30 RX ADMIN — INSULIN LISPRO 6 UNITS: 100 INJECTION, SOLUTION INTRAVENOUS; SUBCUTANEOUS at 09:04

## 2021-04-30 RX ADMIN — SILVER SULFADIAZINE: 10 CREAM TOPICAL at 12:41

## 2021-04-30 RX ADMIN — DICLOFENAC SODIUM TOPICAL GEL, 1%, 2 G: 10 GEL TOPICAL at 12:41

## 2021-04-30 RX ADMIN — GABAPENTIN 400 MG: 400 CAPSULE ORAL at 09:02

## 2021-04-30 RX ADMIN — INSULIN LISPRO 2 UNITS: 100 INJECTION, SOLUTION INTRAVENOUS; SUBCUTANEOUS at 12:40

## 2021-04-30 RX ADMIN — Medication 1 TABLET: at 09:02

## 2021-04-30 RX ADMIN — APIXABAN 5 MG: 5 TABLET, FILM COATED ORAL at 09:02

## 2021-04-30 RX ADMIN — ASPIRIN 81 MG: 81 TABLET, CHEWABLE ORAL at 09:02

## 2021-04-30 NOTE — ASSESSMENT & PLAN NOTE
· States has not had prescription for Flomax since last admitted to hospital  Now with urinary retention which may be 2/2 UTI, or may be the cause of UTI    · c/w Flomax in am  ·

## 2021-04-30 NOTE — ASSESSMENT & PLAN NOTE
Lab Results   Component Value Date    HGBA1C 7 2 (H) 04/28/2021       Recent Labs     04/29/21  1132 04/29/21  1647 04/29/21  2118 04/30/21  0801   POCGLU 234* 275* 178* 271*       Blood Sugar Average: Last 72 hrs:  (P) 801 [id number]     · Continue home dose 70/30 insulin 40 units TID AC  · FSBS AC & HS w/ SSI  · Diabetic diet

## 2021-04-30 NOTE — PLAN OF CARE
Problem: Potential for Falls  Goal: Patient will remain free of falls  Description: INTERVENTIONS:  - Assess patient frequently for physical needs  -  Identify cognitive and physical deficits and behaviors that affect risk of falls    -  [place] fall precautions as indicated by assessment   - Educate patient/family on patient safety including physical limitations  - Instruct patient to call for assistance with activity based on assessment  - Modify environment to reduce risk of injury  - Consider OT/PT consult to assist with strengthening/mobility  4/30/2021 1131 by Zafar Chamorro RN  Outcome: Adequate for Discharge  4/30/2021 1130 by Zafar Chamorro RN  Outcome: Progressing     Problem: Prexisting or High Potential for Compromised Skin Integrity  Goal: Skin integrity is maintained or improved  Description: INTERVENTIONS:  - Identify patients at risk for skin breakdown  - Assess and monitor skin integrity  - Assess and monitor nutrition and hydration status  - Monitor labs   - Assess for incontinence   - Turn and reposition patient  - Assist with mobility/ambulation  - Relieve pressure over bony prominences  - Avoid friction and shearing  - Provide appropriate hygiene as needed including keeping skin clean and dry  - Evaluate need for skin moisturizer/barrier cream  - Collaborate with interdisciplinary team   - Patient/family teaching  - Consider wound care consult   4/30/2021 1131 by Zafar Chamorro RN  Outcome: Adequate for Discharge  4/30/2021 1130 by Zafar Chamorro RN  Outcome: Progressing     Problem: PAIN - ADULT  Goal: Verbalizes/displays adequate comfort level or baseline comfort level  Description: Interventions:  - Encourage patient to monitor pain and request assistance  - Assess pain using appropriate pain scale  - Administer analgesics based on type and severity of pain and evaluate response  - Implement non-pharmacological measures as appropriate and evaluate response  - Consider cultural and social influences on pain and pain management  - Notify physician/advanced practitioner if interventions unsuccessful or patient reports new pain  4/30/2021 1131 by Nika Zapien RN  Outcome: Adequate for Discharge  4/30/2021 1130 by Nika Zapien RN  Outcome: Progressing     Problem: INFECTION - ADULT  Goal: Absence or prevention of progression during hospitalization  Description: INTERVENTIONS:  - Assess and monitor for signs and symptoms of infection  - Monitor lab/diagnostic results  - Monitor all insertion sites, i e  indwelling lines, tubes, and drains  - Monitor endotracheal if appropriate and nasal secretions for changes in amount and color  - [place] appropriate cooling/warming therapies per order  - Administer medications as ordered  - Instruct and encourage patient and family to use good hand hygiene technique  - Identify and instruct in appropriate isolation precautions for identified infection/condition  4/30/2021 1131 by Nika Zapien RN  Outcome: Adequate for Discharge  4/30/2021 1130 by Nika Zapien RN  Outcome: Progressing     Problem: SAFETY ADULT  Goal: Patient will remain free of falls  Description: INTERVENTIONS:  - Assess patient frequently for physical needs  -  Identify cognitive and physical deficits and behaviors that affect risk of falls    -  [place] fall precautions as indicated by assessment   - Educate patient/family on patient safety including physical limitations  - Instruct patient to call for assistance with activity based on assessment  - Modify environment to reduce risk of injury  - Consider OT/PT consult to assist with strengthening/mobility  4/30/2021 1131 by Nika Zapien RN  Outcome: Adequate for Discharge  4/30/2021 1130 by Nika Zapien RN  Outcome: Progressing  Goal: Maintain or return to baseline ADL function  Description: INTERVENTIONS:  -  Assess patient's ability to carry out ADLs; assess patient's baseline for ADL function and identify physical deficits which impact ability to perform ADLs (bathing, care of mouth/teeth, toileting, grooming, dressing, etc )  - Assess/evaluate cause of self-care deficits   - Assess range of motion  - Assess patient's mobility; develop plan if impaired  - Assess patient's need for assistive devices and provide as appropriate  - Encourage maximum independence but intervene and supervise when necessary  - Involve family in performance of ADLs  - Assess for home care needs following discharge   - Consider OT consult to assist with ADL evaluation and planning for discharge  - Provide patient education as appropriate  4/30/2021 1131 by Delmar Reese RN  Outcome: Adequate for Discharge  4/30/2021 1130 by Delmar Reese RN  Outcome: Progressing  Goal: Maintain or return mobility status to optimal level  Description: INTERVENTIONS:  - Assess patient's baseline mobility status (ambulation, transfers, stairs, etc )    - Identify cognitive and physical deficits and behaviors that affect mobility  - Identify mobility aids required to assist with transfers and/or ambulation (gait belt, sit-to-stand, lift, walker, cane, etc )  - [place] fall precautions as indicated by assessment  - Record patient progress and toleration of activity level on Mobility SBAR; progress patient to next Phase/Stage  - Instruct patient to call for assistance with activity based on assessment  - Consider rehabilitation consult to assist with strengthening/weightbearing, etc   4/30/2021 1131 by Delmar Reese RN  Outcome: Adequate for Discharge  4/30/2021 1130 by Delmar Reese RN  Outcome: Progressing     Problem: DISCHARGE PLANNING  Goal: Discharge to home or other facility with appropriate resources  Description: INTERVENTIONS:  - Identify barriers to discharge w/patient and caregiver  - Arrange for needed discharge resources and transportation as appropriate  - Identify discharge learning needs (meds, wound care, etc )  - Arrange for interpretive services to assist at discharge as needed  - Refer to Case Management Department for coordinating discharge planning if the patient needs post-hospital services based on physician/advanced practitioner order or complex needs related to functional status, cognitive ability, or social support system  4/30/2021 1131 by Harmeet Powell RN  Outcome: Adequate for Discharge  4/30/2021 1130 by Harmeet Powell RN  Outcome: Progressing     Problem: Knowledge Deficit  Goal: Patient/family/caregiver demonstrates understanding of disease process, treatment plan, medications, and discharge instructions  Description: Complete learning assessment and assess knowledge base    Interventions:  - Provide teaching at level of understanding  - Provide teaching via preferred learning methods  4/30/2021 1131 by Harmeet Powell RN  Outcome: Adequate for Discharge  4/30/2021 1130 by Harmeet Powell RN  Outcome: Progressing     Problem: GENITOURINARY - ADULT  Goal: Maintains or returns to baseline urinary function  Description: INTERVENTIONS:  - Assess urinary function  - Encourage oral fluids to ensure adequate hydration if ordered  - Administer IV fluids as ordered to ensure adequate hydration  - Administer ordered medications as needed  - Offer frequent toileting  - Follow urinary retention protocol if ordered  4/30/2021 1131 by Harmeet Powell RN  Outcome: Adequate for Discharge  4/30/2021 1130 by Harmeet Powell RN  Outcome: Progressing  Goal: Absence of urinary retention  Description: INTERVENTIONS:  - Assess patients ability to void and empty bladder  - Monitor I/O  - Bladder scan as needed  - Discuss with physician/AP medications to alleviate retention as needed  - Discuss catheterization for long term situations as appropriate  4/30/2021 1131 by Harmeet Powell RN  Outcome: Adequate for Discharge  4/30/2021 1130 by Harmeet Powell RN  Outcome: Progressing  Goal: Urinary catheter remains patent  Description: INTERVENTIONS:  - Assess patency of urinary catheter  - If patient has a chronic mcgregor, consider changing catheter if non-functioning  - Follow guidelines for intermittent irrigation of non-functioning urinary catheter  4/30/2021 1131 by Nika Zapien RN  Outcome: Adequate for Discharge  4/30/2021 1130 by Nika Zapien RN  Outcome: Progressing     Problem: MUSCULOSKELETAL - ADULT  Goal: Maintain or return mobility to safest level of function  Description: INTERVENTIONS:  - Assess patient's ability to carry out ADLs; assess patient's baseline for ADL function and identify physical deficits which impact ability to perform ADLs (bathing, care of mouth/teeth, toileting, grooming, dressing, etc )  - Assess/evaluate cause of self-care deficits   - Assess range of motion  - Assess patient's mobility  - Assess patient's need for assistive devices and provide as appropriate  - Encourage maximum independence but intervene and supervise when necessary  - Involve family in performance of ADLs  - Assess for home care needs following discharge   - Consider OT consult to assist with ADL evaluation and planning for discharge  - Provide patient education as appropriate  4/30/2021 1131 by Nika Zapien RN  Outcome: Adequate for Discharge  4/30/2021 1130 by Nika Zapien RN  Outcome: Progressing  Goal: Maintain proper alignment of affected body part  Description: INTERVENTIONS:  - Support, maintain and protect limb and body alignment  - Provide patient/ family with appropriate education  4/30/2021 1131 by Nika Zapien RN  Outcome: Adequate for Discharge  4/30/2021 1130 by Nika Zapien RN  Outcome: Progressing

## 2021-04-30 NOTE — DISCHARGE SUMMARY
[address] [facility]  Discharge- Fabiola Stuart 1960, 62 y o  male MRN: [id number]  Unit/Bed#: -[facility] Encounter: [id number]  Primary Care Provider: Eliodoro Dandy, MD   Date and time admitted to hospital: 4/27/2021  8:17 PM    Chronic atrial fibrillation ([address] )  Assessment & Plan  · Rate controlled  · Continue home dose Eliquis and Sotalol    Type 2 diabetes mellitus with hyperglycemia, with long-term current use of insulin [facility])  Assessment & Plan  Lab Results   Component Value Date    HGBA1C 7 2 (H) 04/28/2021       Recent Labs     04/29/21  1132 04/29/21  1647 04/29/21  2118 04/30/21  0801   POCGLU 234* 275* 178* 271*       Blood Sugar Average: Last 72 hrs:  (P) 240 [id number]     · Continue home dose 70/30 insulin 40 units TID AC  · FSBS AC & HS w/ SSI  · Diabetic diet    Urinary retention  Assessment & Plan  · States has not had prescription for Flomax since last admitted to hospital  Now with urinary retention which may be 2/2 UTI, or may be the cause of UTI  · c/w Flomax in am  ·     Chronic left-sided low back pain with sciatica  Assessment & Plan  · Continue home dose Neurontin    * Acute cystitis without hematuria  Assessment & Plan  · Reports difficulty urinating x 2 days  With (+) frequency and inability to fully empty bladder  Denies hematuria  (+) fever/chills since evening prior to admission  (+) suprapubic pain  (+) decreased appetite  · Temp 102 on admission  WBC 15  12  LA 1 4  · UA revealed > 1000 glucose  Trace blood  (-) Nitrite  4-10 RBC   10-20 WBC and Innumerable bacteria  · Urine culture grew back Klebsiella  · WBC count has normalized, afebrile  · Will transition to oral Keflex to complete therapy based on sensitivities        Discharging Physician / Practitioner: Gail Hamilton MD  PCP: Eliodoro Dandy, MD  Admission Date:   Admission Orders (From admission, onward)     Ordered        04/27/21 2324  Inpatient Admission  Once                   Discharge Date: 04/30/21    Resolved Problems  Date Reviewed: 4/30/2021    None          Consultations During Hospital Stay:  · none    Procedures Performed:   · none    Significant Findings / Test Results:   Xr Hip/pelv 2-3 Vws Left If Performed    Result Date: 4/28/2021  Impression: No acute osseous abnormality  Additional findings, as described  Workstation performed: [id number]     Xr Femur 2 Views Left    Result Date: 4/28/2021  Impression: No acute osseous abnormality  Workstation performed: [id number]     Ct Recon Only Lumbar Spine    Result Date: 4/27/2021  Impression: No fracture or traumatic subluxation  Workstation performed: [id number]     Ct Abdomen Pelvis W Contrast    Result Date: 4/27/2021  · Impression: Findings consistent with cystitis Workstation performed: [id number]     Incidental Findings:   · none     Test Results Pending at Discharge (will require follow up):   · none     Outpatient Tests Requested:  · none    Complications:  none    Reason for Admission: UTI    Hospital Course:     Unknown Quivers is a 62 y o  male patient who originally presented to the hospital on 4/27/2021 past medical history significant of type 2 diabetes, atrial fibrillation on Eliquis initially presented due to UTI and urinary retention  He was treated with IV antibiotics with significant improvement in was transition oral Keflex  Initially had a Hauser catheter placed however was then removed and urinary retention resolvedf  Will follow-up PCP in 1 week        Please see above list of diagnoses and related plan for additional information       Condition at Discharge: stable     Discharge Day Visit / Exam:     Subjective:  No acute complaints  Vitals: Blood Pressure: 123/63 (04/30/21 0803)  Pulse: 62 (04/30/21 0803)  Temperature: 99 6 °F (37 6 °C) (04/30/21 0803)  Temp Source: Oral (04/28/21 0700)  Respirations: 17 (04/30/21 0803)  Height: 6' 3" (190 5 cm) (04/28/21 2100)  Weight - Scale: (!) 140 kg (308 lb 10 3 oz) (04/28/21 2100)  SpO2: 95 % (04/30/21 0803)  Exam:   Physical Exam  Constitutional:       General: He is not in acute distress  Appearance: He is well-developed  He is not diaphoretic  HENT:      Head: Normocephalic and atraumatic  Nose: Nose normal       Mouth/Throat:      Pharynx: No oropharyngeal exudate  Eyes:      General: No scleral icterus  Conjunctiva/sclera: Conjunctivae normal    Neck:      Musculoskeletal: Normal range of motion and neck supple  Cardiovascular:      Rate and Rhythm: Normal rate and regular rhythm  Heart sounds: Normal heart sounds  No murmur  No friction rub  No gallop  Pulmonary:      Effort: Pulmonary effort is normal  No respiratory distress  Breath sounds: Normal breath sounds  No wheezing or rales  Chest:      Chest wall: No tenderness  Abdominal:      General: Bowel sounds are normal  There is no distension  Palpations: Abdomen is soft  Tenderness: There is no abdominal tenderness  There is no guarding  Musculoskeletal: Normal range of motion  General: No tenderness or deformity  Skin:     General: Skin is warm and dry  Findings: No erythema  Neurological:      Mental Status: He is alert  Mental status is at baseline  Discussion with Family: pt    Discharge instructions/Information to patient and family:   See after visit summary for information provided to patient and family  Provisions for Follow-Up Care:  See after visit summary for information related to follow-up care and any pertinent home health orders  Disposition:     Home    For Discharges to [facility] SNF:   · Not Applicable to this Patient - Not Applicable to this Patient    Planned Readmission: none     Discharge Statement:  I spent 60 minutes discharging the patient  This time was spent on the day of discharge  I had direct contact with the patient on the day of discharge   Greater than 50% of the total time was spent examining patient, answering all patient questions, arranging and discussing plan of care with patient as well as directly providing post-discharge instructions  Additional time then spent on discharge activities  Discharge Medications:  See after visit summary for reconciled discharge medications provided to patient and family        ** Please Note: This note has been constructed using a voice recognition system **

## 2021-04-30 NOTE — PLAN OF CARE
Problem: Potential for Falls  Goal: Patient will remain free of falls  Description: INTERVENTIONS:  - Assess patient frequently for physical needs  -  Identify cognitive and physical deficits and behaviors that affect risk of falls    -  [place] fall precautions as indicated by assessment   - Educate patient/family on patient safety including physical limitations  - Instruct patient to call for assistance with activity based on assessment  - Modify environment to reduce risk of injury  - Consider OT/PT consult to assist with strengthening/mobility  Outcome: Progressing     Problem: Prexisting or High Potential for Compromised Skin Integrity  Goal: Skin integrity is maintained or improved  Description: INTERVENTIONS:  - Identify patients at risk for skin breakdown  - Assess and monitor skin integrity  - Assess and monitor nutrition and hydration status  - Monitor labs   - Assess for incontinence   - Turn and reposition patient  - Assist with mobility/ambulation  - Relieve pressure over bony prominences  - Avoid friction and shearing  - Provide appropriate hygiene as needed including keeping skin clean and dry  - Evaluate need for skin moisturizer/barrier cream  - Collaborate with interdisciplinary team   - Patient/family teaching  - Consider wound care consult   Outcome: Progressing     Problem: PAIN - ADULT  Goal: Verbalizes/displays adequate comfort level or baseline comfort level  Description: Interventions:  - Encourage patient to monitor pain and request assistance  - Assess pain using appropriate pain scale  - Administer analgesics based on type and severity of pain and evaluate response  - Implement non-pharmacological measures as appropriate and evaluate response  - Consider cultural and social influences on pain and pain management  - Notify physician/advanced practitioner if interventions unsuccessful or patient reports new pain  Outcome: Progressing     Problem: INFECTION - ADULT  Goal: Absence or prevention of progression during hospitalization  Description: INTERVENTIONS:  - Assess and monitor for signs and symptoms of infection  - Monitor lab/diagnostic results  - Monitor all insertion sites, i e  indwelling lines, tubes, and drains  - Monitor endotracheal if appropriate and nasal secretions for changes in amount and color  - [place] appropriate cooling/warming therapies per order  - Administer medications as ordered  - Instruct and encourage patient and family to use good hand hygiene technique  - Identify and instruct in appropriate isolation precautions for identified infection/condition  Outcome: Progressing     Problem: SAFETY ADULT  Goal: Patient will remain free of falls  Description: INTERVENTIONS:  - Assess patient frequently for physical needs  -  Identify cognitive and physical deficits and behaviors that affect risk of falls    -  [place] fall precautions as indicated by assessment   - Educate patient/family on patient safety including physical limitations  - Instruct patient to call for assistance with activity based on assessment  - Modify environment to reduce risk of injury  - Consider OT/PT consult to assist with strengthening/mobility  Outcome: Progressing  Goal: Maintain or return to baseline ADL function  Description: INTERVENTIONS:  -  Assess patient's ability to carry out ADLs; assess patient's baseline for ADL function and identify physical deficits which impact ability to perform ADLs (bathing, care of mouth/teeth, toileting, grooming, dressing, etc )  - Assess/evaluate cause of self-care deficits   - Assess range of motion  - Assess patient's mobility; develop plan if impaired  - Assess patient's need for assistive devices and provide as appropriate  - Encourage maximum independence but intervene and supervise when necessary  - Involve family in performance of ADLs  - Assess for home care needs following discharge   - Consider OT consult to assist with ADL evaluation and planning for discharge  - Provide patient education as appropriate  Outcome: Progressing  Goal: Maintain or return mobility status to optimal level  Description: INTERVENTIONS:  - Assess patient's baseline mobility status (ambulation, transfers, stairs, etc )    - Identify cognitive and physical deficits and behaviors that affect mobility  - Identify mobility aids required to assist with transfers and/or ambulation (gait belt, sit-to-stand, lift, walker, cane, etc )  - [place] fall precautions as indicated by assessment  - Record patient progress and toleration of activity level on Mobility SBAR; progress patient to next Phase/Stage  - Instruct patient to call for assistance with activity based on assessment  - Consider rehabilitation consult to assist with strengthening/weightbearing, etc   Outcome: Progressing     Problem: DISCHARGE PLANNING  Goal: Discharge to home or other facility with appropriate resources  Description: INTERVENTIONS:  - Identify barriers to discharge w/patient and caregiver  - Arrange for needed discharge resources and transportation as appropriate  - Identify discharge learning needs (meds, wound care, etc )  - Arrange for interpretive services to assist at discharge as needed  - Refer to Case Management Department for coordinating discharge planning if the patient needs post-hospital services based on physician/advanced practitioner order or complex needs related to functional status, cognitive ability, or social support system  Outcome: Progressing     Problem: Knowledge Deficit  Goal: Patient/family/caregiver demonstrates understanding of disease process, treatment plan, medications, and discharge instructions  Description: Complete learning assessment and assess knowledge base    Interventions:  - Provide teaching at level of understanding  - Provide teaching via preferred learning methods  Outcome: Progressing     Problem: GENITOURINARY - ADULT  Goal: Maintains or returns to baseline urinary function  Description: INTERVENTIONS:  - Assess urinary function  - Encourage oral fluids to ensure adequate hydration if ordered  - Administer IV fluids as ordered to ensure adequate hydration  - Administer ordered medications as needed  - Offer frequent toileting  - Follow urinary retention protocol if ordered  Outcome: Progressing  Goal: Absence of urinary retention  Description: INTERVENTIONS:  - Assess patients ability to void and empty bladder  - Monitor I/O  - Bladder scan as needed  - Discuss with physician/AP medications to alleviate retention as needed  - Discuss catheterization for long term situations as appropriate  Outcome: Progressing  Goal: Urinary catheter remains patent  Description: INTERVENTIONS:  - Assess patency of urinary catheter  - If patient has a chronic mcgregor, consider changing catheter if non-functioning  - Follow guidelines for intermittent irrigation of non-functioning urinary catheter  Outcome: Progressing     Problem: MUSCULOSKELETAL - ADULT  Goal: Maintain or return mobility to safest level of function  Description: INTERVENTIONS:  - Assess patient's ability to carry out ADLs; assess patient's baseline for ADL function and identify physical deficits which impact ability to perform ADLs (bathing, care of mouth/teeth, toileting, grooming, dressing, etc )  - Assess/evaluate cause of self-care deficits   - Assess range of motion  - Assess patient's mobility  - Assess patient's need for assistive devices and provide as appropriate  - Encourage maximum independence but intervene and supervise when necessary  - Involve family in performance of ADLs  - Assess for home care needs following discharge   - Consider OT consult to assist with ADL evaluation and planning for discharge  - Provide patient education as appropriate  Outcome: Progressing  Goal: Maintain proper alignment of affected body part  Description: INTERVENTIONS:  - Support, maintain and protect limb and body alignment  - Provide patient/ family with appropriate education  Outcome: Progressing

## 2021-04-30 NOTE — ASSESSMENT & PLAN NOTE
· Reports difficulty urinating x 2 days  With (+) frequency and inability to fully empty bladder  Denies hematuria  (+) fever/chills since evening prior to admission  (+) suprapubic pain  (+) decreased appetite  · Temp 102 on admission  WBC 15  12  LA 1 4  · UA revealed > 1000 glucose  Trace blood  (-) Nitrite  4-10 RBC   10-20 WBC and Innumerable bacteria  · Urine culture grew back Klebsiella  · WBC count has normalized, afebrile  · Will transition to oral Keflex to complete therapy based on sensitivities

## 2021-05-03 LAB
BACTERIA BLD CULT: NORMAL
BACTERIA BLD CULT: NORMAL

## 2022-10-12 PROBLEM — N30.00 ACUTE CYSTITIS WITHOUT HEMATURIA: Status: RESOLVED | Noted: 2021-04-28 | Resolved: 2022-10-12

## 2022-12-06 ENCOUNTER — TELEPHONE (OUTPATIENT)
Dept: GASTROENTEROLOGY | Facility: CLINIC | Age: 62
End: 2022-12-06